# Patient Record
Sex: FEMALE | Race: OTHER | Employment: FULL TIME | URBAN - METROPOLITAN AREA
[De-identification: names, ages, dates, MRNs, and addresses within clinical notes are randomized per-mention and may not be internally consistent; named-entity substitution may affect disease eponyms.]

---
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
## 2020-07-09 ENCOUNTER — HOSPITAL ENCOUNTER (EMERGENCY)
Facility: HOSPITAL | Age: 20
Discharge: HOME | End: 2020-07-09
Attending: EMERGENCY MEDICINE
Payer: MEDICAID

## 2020-07-09 VITALS
HEART RATE: 83 BPM | WEIGHT: 175 LBS | OXYGEN SATURATION: 99 % | DIASTOLIC BLOOD PRESSURE: 83 MMHG | SYSTOLIC BLOOD PRESSURE: 133 MMHG | RESPIRATION RATE: 16 BRPM | BODY MASS INDEX: 31.01 KG/M2 | HEIGHT: 63 IN | TEMPERATURE: 98 F

## 2020-07-09 DIAGNOSIS — L02.91 ABSCESS: Primary | ICD-10-CM

## 2020-07-09 LAB — B-HCG UR QL: NEGATIVE

## 2020-07-09 PROCEDURE — 81025 URINE PREGNANCY TEST: CPT

## 2020-07-09 PROCEDURE — 87077 CULTURE AEROBIC IDENTIFY: CPT | Performed by: EMERGENCY MEDICINE

## 2020-07-09 PROCEDURE — 10061 I&D ABSCESS COMP/MULTIPLE: CPT

## 2020-07-09 PROCEDURE — 87205 SMEAR GRAM STAIN: CPT | Performed by: EMERGENCY MEDICINE

## 2020-07-09 PROCEDURE — 87070 CULTURE OTHR SPECIMN AEROBIC: CPT | Performed by: EMERGENCY MEDICINE

## 2020-07-09 PROCEDURE — 99283 EMERGENCY DEPT VISIT LOW MDM: CPT

## 2020-07-09 RX ORDER — CEPHALEXIN 500 MG/1
500 CAPSULE ORAL 3 TIMES DAILY
Qty: 30 CAPSULE | Refills: 0 | Status: SHIPPED | OUTPATIENT
Start: 2020-07-09 | End: 2020-07-19

## 2020-07-09 RX ORDER — LIDOCAINE HYDROCHLORIDE AND EPINEPHRINE 20; 5 MG/ML; UG/ML
INJECTION, SOLUTION EPIDURAL; INFILTRATION; INTRACAUDAL; PERINEURAL
Status: COMPLETED
Start: 2020-07-09 | End: 2020-07-09

## 2020-07-09 RX ORDER — CEPHALEXIN 500 MG/1
500 CAPSULE ORAL 3 TIMES DAILY
Qty: 21 CAPSULE | Refills: 0 | Status: SHIPPED | OUTPATIENT
Start: 2020-07-09 | End: 2020-07-09

## 2020-07-09 RX ORDER — SULFAMETHOXAZOLE AND TRIMETHOPRIM 800; 160 MG/1; MG/1
1 TABLET ORAL 2 TIMES DAILY
Qty: 20 TABLET | Refills: 0 | Status: SHIPPED | OUTPATIENT
Start: 2020-07-09 | End: 2020-07-19

## 2020-07-09 RX ORDER — LIDOCAINE HYDROCHLORIDE AND EPINEPHRINE 20; 5 MG/ML; UG/ML
10 INJECTION, SOLUTION EPIDURAL; INFILTRATION; INTRACAUDAL; PERINEURAL ONCE
Status: COMPLETED | OUTPATIENT
Start: 2020-07-09 | End: 2020-07-09

## 2020-07-09 NOTE — ED NOTES
PT safe to DC home per MD. Gael Arzola to dress self. DC teaching done, pt verbalizes understanding. Ambulatory with steady gait to exit.

## 2020-07-09 NOTE — ED INITIAL ASSESSMENT (HPI)
Pt came in for abscess to buttocks for a week. Afebrile. Redness noted and painful with movement. RR even and nonlabored, speaking in full sentences, ambulatory with steady gait.

## 2020-07-09 NOTE — ED PROVIDER NOTES
Patient Seen in: [facility] Emergency Department    History   Patient presents with:  Abscess    Stated Complaint:     HPI  Patient complains of skin infection for  7 days. Located R buttock. Describes as pressure and pain with sitting.   No fev erythematous, +ttp  PSYCH: calm, cooperative,          ED Course     Labs Reviewed   [facility] POCT PREGNANCY URINE - Normal   AEROBIC BACTERIAL CULTURE       MDM             Procedures:    Abscess drainage: The patient abscess was located R buttocks.    I obtai

## 2020-07-12 NOTE — PROGRESS NOTES
Patient treated appropriately, no further action needed. This likely represents skin dedrick. Thank you.

## 2021-12-23 ENCOUNTER — LAB ENCOUNTER (OUTPATIENT)
Dept: LAB | Age: 21
End: 2021-12-23
Attending: FAMILY MEDICINE
Payer: MEDICAID

## 2021-12-23 ENCOUNTER — TELEMEDICINE (OUTPATIENT)
Dept: FAMILY MEDICINE CLINIC | Facility: CLINIC | Age: 21
End: 2021-12-23

## 2021-12-23 DIAGNOSIS — N91.2 AMENORRHEA: ICD-10-CM

## 2021-12-23 DIAGNOSIS — N91.2 AMENORRHEA: Primary | ICD-10-CM

## 2021-12-23 PROCEDURE — 36415 COLL VENOUS BLD VENIPUNCTURE: CPT

## 2021-12-23 PROCEDURE — 99213 OFFICE O/P EST LOW 20 MIN: CPT | Performed by: FAMILY MEDICINE

## 2021-12-23 PROCEDURE — 84703 CHORIONIC GONADOTROPIN ASSAY: CPT

## 2021-12-23 NOTE — PROGRESS NOTES
VIDEO VISIT      Patient presents today complaining of 3 days of nasal congestion that is clear. Some nocturnal cough denies shortness of breath or tooth pain no bad breath. No headache no facial pain no fever. Took 3 Covid test all were negative.   Adri Lee

## 2021-12-24 ENCOUNTER — PATIENT MESSAGE (OUTPATIENT)
Dept: FAMILY MEDICINE CLINIC | Facility: CLINIC | Age: 21
End: 2021-12-24

## 2021-12-24 NOTE — TELEPHONE ENCOUNTER
From: Nia Lozoya  To: Quin Paredes DO  Sent: 12/24/2021 10:40 AM CST  Subject: Question regarding HCG, BETA SUBUNIT, QUAL    Where do I get the sudafed? You can prescribe me a medication? I went to the doctors for a reason.  Steve Bloom been taking [facility]

## 2022-01-06 RX ORDER — IBUPROFEN 800 MG/1
800 TABLET ORAL 3 TIMES DAILY
Qty: 90 TABLET | Refills: 0 | Status: SHIPPED | OUTPATIENT
Start: 2022-01-06

## 2022-04-06 ENCOUNTER — OFFICE VISIT (OUTPATIENT)
Dept: FAMILY MEDICINE CLINIC | Facility: CLINIC | Age: 22
End: 2022-04-06
Payer: MEDICAID

## 2022-04-06 VITALS
WEIGHT: 209 LBS | HEIGHT: 63 IN | BODY MASS INDEX: 37.03 KG/M2 | HEART RATE: 67 BPM | DIASTOLIC BLOOD PRESSURE: 69 MMHG | SYSTOLIC BLOOD PRESSURE: 107 MMHG

## 2022-04-06 DIAGNOSIS — G89.29 CHRONIC BILATERAL THORACIC BACK PAIN: ICD-10-CM

## 2022-04-06 DIAGNOSIS — N62 LARGE BREASTS: ICD-10-CM

## 2022-04-06 DIAGNOSIS — M54.6 CHRONIC BILATERAL THORACIC BACK PAIN: ICD-10-CM

## 2022-04-06 DIAGNOSIS — Z00.00 WELL ADULT EXAM: Primary | ICD-10-CM

## 2022-04-06 PROCEDURE — 3078F DIAST BP <80 MM HG: CPT | Performed by: NURSE PRACTITIONER

## 2022-04-06 PROCEDURE — 99395 PREV VISIT EST AGE 18-39: CPT | Performed by: NURSE PRACTITIONER

## 2022-04-06 PROCEDURE — 3008F BODY MASS INDEX DOCD: CPT | Performed by: NURSE PRACTITIONER

## 2022-04-06 PROCEDURE — 3074F SYST BP LT 130 MM HG: CPT | Performed by: NURSE PRACTITIONER

## 2022-04-27 ENCOUNTER — OFFICE VISIT (OUTPATIENT)
Dept: FAMILY MEDICINE CLINIC | Facility: CLINIC | Age: 22
End: 2022-04-27
Payer: MEDICAID

## 2022-04-27 VITALS
HEART RATE: 83 BPM | WEIGHT: 203 LBS | BODY MASS INDEX: 35.97 KG/M2 | TEMPERATURE: 98 F | HEIGHT: 63 IN | DIASTOLIC BLOOD PRESSURE: 69 MMHG | SYSTOLIC BLOOD PRESSURE: 107 MMHG

## 2022-04-27 DIAGNOSIS — M54.6 CHRONIC BILATERAL THORACIC BACK PAIN: ICD-10-CM

## 2022-04-27 DIAGNOSIS — N62 LARGE BREASTS: ICD-10-CM

## 2022-04-27 DIAGNOSIS — J02.9 SORE THROAT: ICD-10-CM

## 2022-04-27 DIAGNOSIS — G89.29 CHRONIC BILATERAL THORACIC BACK PAIN: ICD-10-CM

## 2022-04-27 DIAGNOSIS — H66.91 RIGHT ACUTE OTITIS MEDIA: Primary | ICD-10-CM

## 2022-04-27 LAB
CONTROL LINE PRESENT WITH A CLEAR BACKGROUND (YES/NO): YES YES/NO
KIT LOT #: NORMAL NUMERIC
SARS-COV-2 RNA RESP QL NAA+PROBE: NOT DETECTED
STREP GRP A CUL-SCR: NEGATIVE

## 2022-04-27 PROCEDURE — 3008F BODY MASS INDEX DOCD: CPT | Performed by: NURSE PRACTITIONER

## 2022-04-27 PROCEDURE — 3074F SYST BP LT 130 MM HG: CPT | Performed by: NURSE PRACTITIONER

## 2022-04-27 PROCEDURE — 99214 OFFICE O/P EST MOD 30 MIN: CPT | Performed by: NURSE PRACTITIONER

## 2022-04-27 PROCEDURE — 87880 STREP A ASSAY W/OPTIC: CPT | Performed by: NURSE PRACTITIONER

## 2022-04-27 PROCEDURE — 3078F DIAST BP <80 MM HG: CPT | Performed by: NURSE PRACTITIONER

## 2022-04-27 RX ORDER — AMOXICILLIN AND CLAVULANATE POTASSIUM 875; 125 MG/1; MG/1
1 TABLET, FILM COATED ORAL 2 TIMES DAILY
Qty: 14 TABLET | Refills: 0 | Status: SHIPPED | OUTPATIENT
Start: 2022-04-27 | End: 2022-05-04

## 2022-06-01 ENCOUNTER — APPOINTMENT (OUTPATIENT)
Dept: PHYSICAL THERAPY | Age: 22
End: 2022-06-01
Attending: NURSE PRACTITIONER
Payer: MEDICAID

## 2022-06-03 ENCOUNTER — APPOINTMENT (OUTPATIENT)
Dept: PHYSICAL THERAPY | Age: 22
End: 2022-06-03
Attending: NURSE PRACTITIONER
Payer: MEDICAID

## 2022-06-08 ENCOUNTER — TELEPHONE (OUTPATIENT)
Dept: PHYSICAL THERAPY | Facility: HOSPITAL | Age: 22
End: 2022-06-08

## 2022-06-08 ENCOUNTER — APPOINTMENT (OUTPATIENT)
Dept: PHYSICAL THERAPY | Age: 22
End: 2022-06-08
Attending: NURSE PRACTITIONER
Payer: MEDICAID

## 2022-06-10 ENCOUNTER — APPOINTMENT (OUTPATIENT)
Dept: PHYSICAL THERAPY | Age: 22
End: 2022-06-10
Attending: NURSE PRACTITIONER
Payer: MEDICAID

## 2022-06-15 ENCOUNTER — APPOINTMENT (OUTPATIENT)
Dept: PHYSICAL THERAPY | Age: 22
End: 2022-06-15
Attending: NURSE PRACTITIONER
Payer: MEDICAID

## 2022-06-17 ENCOUNTER — APPOINTMENT (OUTPATIENT)
Dept: PHYSICAL THERAPY | Age: 22
End: 2022-06-17
Attending: NURSE PRACTITIONER
Payer: MEDICAID

## 2022-06-22 ENCOUNTER — APPOINTMENT (OUTPATIENT)
Dept: PHYSICAL THERAPY | Age: 22
End: 2022-06-22
Attending: NURSE PRACTITIONER
Payer: MEDICAID

## 2022-06-24 ENCOUNTER — APPOINTMENT (OUTPATIENT)
Dept: PHYSICAL THERAPY | Age: 22
End: 2022-06-24
Attending: NURSE PRACTITIONER
Payer: MEDICAID

## 2022-07-07 ENCOUNTER — APPOINTMENT (OUTPATIENT)
Dept: PHYSICAL THERAPY | Age: 22
End: 2022-07-07
Attending: NURSE PRACTITIONER
Payer: MEDICAID

## 2022-07-12 ENCOUNTER — APPOINTMENT (OUTPATIENT)
Dept: PHYSICAL THERAPY | Age: 22
End: 2022-07-12
Attending: NURSE PRACTITIONER
Payer: MEDICAID

## 2022-07-14 ENCOUNTER — APPOINTMENT (OUTPATIENT)
Dept: PHYSICAL THERAPY | Age: 22
End: 2022-07-14
Attending: NURSE PRACTITIONER
Payer: MEDICAID

## 2022-07-29 ENCOUNTER — LAB ENCOUNTER (OUTPATIENT)
Dept: LAB | Age: 22
End: 2022-07-29
Attending: NURSE PRACTITIONER
Payer: MEDICAID

## 2022-07-29 ENCOUNTER — TELEPHONE (OUTPATIENT)
Dept: FAMILY MEDICINE CLINIC | Facility: CLINIC | Age: 22
End: 2022-07-29

## 2022-07-29 ENCOUNTER — PATIENT MESSAGE (OUTPATIENT)
Dept: FAMILY MEDICINE CLINIC | Facility: CLINIC | Age: 22
End: 2022-07-29

## 2022-07-29 DIAGNOSIS — Z00.00 WELL ADULT EXAM: ICD-10-CM

## 2022-07-29 LAB
ALBUMIN SERPL-MCNC: 3.7 G/DL (ref 3.4–5)
ALBUMIN/GLOB SERPL: 1.1 {RATIO} (ref 1–2)
ALP LIVER SERPL-CCNC: 96 U/L
ALT SERPL-CCNC: 33 U/L
ANION GAP SERPL CALC-SCNC: 5 MMOL/L (ref 0–18)
AST SERPL-CCNC: 21 U/L (ref 15–37)
BASOPHILS # BLD AUTO: 0.06 X10(3) UL (ref 0–0.2)
BASOPHILS NFR BLD AUTO: 0.8 %
BILIRUB SERPL-MCNC: 0.4 MG/DL (ref 0.1–2)
BUN BLD-MCNC: 9 MG/DL (ref 7–18)
BUN/CREAT SERPL: 11.3 (ref 10–20)
CALCIUM BLD-MCNC: 8.9 MG/DL (ref 8.5–10.1)
CHLORIDE SERPL-SCNC: 108 MMOL/L (ref 98–112)
CHOLEST SERPL-MCNC: 142 MG/DL (ref ?–200)
CO2 SERPL-SCNC: 27 MMOL/L (ref 21–32)
CREAT BLD-MCNC: 0.8 MG/DL
DEPRECATED RDW RBC AUTO: 44.4 FL (ref 35.1–46.3)
EOSINOPHIL # BLD AUTO: 0.11 X10(3) UL (ref 0–0.7)
EOSINOPHIL NFR BLD AUTO: 1.4 %
ERYTHROCYTE [DISTWIDTH] IN BLOOD BY AUTOMATED COUNT: 12.8 % (ref 11–15)
EST. AVERAGE GLUCOSE BLD GHB EST-MCNC: 105 MG/DL (ref 68–126)
FASTING PATIENT LIPID ANSWER: YES
FASTING STATUS PATIENT QL REPORTED: YES
GLOBULIN PLAS-MCNC: 3.5 G/DL (ref 2.8–4.4)
GLUCOSE BLD-MCNC: 84 MG/DL (ref 70–99)
HBA1C MFR BLD: 5.3 % (ref ?–5.7)
HCT VFR BLD AUTO: 42.6 %
HDLC SERPL-MCNC: 47 MG/DL (ref 40–59)
HGB BLD-MCNC: 14 G/DL
IMM GRANULOCYTES # BLD AUTO: 0.03 X10(3) UL (ref 0–1)
IMM GRANULOCYTES NFR BLD: 0.4 %
LDLC SERPL CALC-MCNC: 80 MG/DL (ref ?–100)
LYMPHOCYTES # BLD AUTO: 2.11 X10(3) UL (ref 1–4)
LYMPHOCYTES NFR BLD AUTO: 26.6 %
MCH RBC QN AUTO: 31.2 PG (ref 26–34)
MCHC RBC AUTO-ENTMCNC: 32.9 G/DL (ref 31–37)
MCV RBC AUTO: 94.9 FL
MONOCYTES # BLD AUTO: 0.5 X10(3) UL (ref 0.1–1)
MONOCYTES NFR BLD AUTO: 6.3 %
NEUTROPHILS # BLD AUTO: 5.13 X10 (3) UL (ref 1.5–7.7)
NEUTROPHILS # BLD AUTO: 5.13 X10(3) UL (ref 1.5–7.7)
NEUTROPHILS NFR BLD AUTO: 64.5 %
NONHDLC SERPL-MCNC: 95 MG/DL (ref ?–130)
OSMOLALITY SERPL CALC.SUM OF ELEC: 288 MOSM/KG (ref 275–295)
PLATELET # BLD AUTO: 227 10(3)UL (ref 150–450)
POTASSIUM SERPL-SCNC: 3.9 MMOL/L (ref 3.5–5.1)
PROT SERPL-MCNC: 7.2 G/DL (ref 6.4–8.2)
RBC # BLD AUTO: 4.49 X10(6)UL
SODIUM SERPL-SCNC: 140 MMOL/L (ref 136–145)
TRIGL SERPL-MCNC: 77 MG/DL (ref 30–149)
TSI SER-ACNC: 1.46 MIU/ML (ref 0.36–3.74)
VLDLC SERPL CALC-MCNC: 12 MG/DL (ref 0–30)
WBC # BLD AUTO: 7.9 X10(3) UL (ref 4–11)

## 2022-07-29 PROCEDURE — 80053 COMPREHEN METABOLIC PANEL: CPT

## 2022-07-29 PROCEDURE — 83036 HEMOGLOBIN GLYCOSYLATED A1C: CPT

## 2022-07-29 PROCEDURE — 84443 ASSAY THYROID STIM HORMONE: CPT

## 2022-07-29 PROCEDURE — 36415 COLL VENOUS BLD VENIPUNCTURE: CPT

## 2022-07-29 PROCEDURE — 85025 COMPLETE CBC W/AUTO DIFF WBC: CPT

## 2022-07-29 PROCEDURE — 80061 LIPID PANEL: CPT

## 2022-07-29 NOTE — TELEPHONE ENCOUNTER
Liz Bragg, states that the patient is there now to do blood work. The patient is requesting a note to excuse her from work today because she had to do her blood. Please, call pt at [phone] with any questions. Pt is requesting the note today.

## 2022-09-12 ENCOUNTER — TELEPHONE (OUTPATIENT)
Dept: INTERNAL MEDICINE CLINIC | Facility: CLINIC | Age: 22
End: 2022-09-12

## 2022-10-24 ENCOUNTER — TELEMEDICINE (OUTPATIENT)
Dept: FAMILY MEDICINE CLINIC | Facility: CLINIC | Age: 22
End: 2022-10-24

## 2022-10-24 ENCOUNTER — TELEPHONE (OUTPATIENT)
Dept: FAMILY MEDICINE CLINIC | Facility: CLINIC | Age: 22
End: 2022-10-24

## 2022-10-24 DIAGNOSIS — G43.009 NONINTRACTABLE MIGRAINE, UNSPECIFIED MIGRAINE TYPE: Primary | ICD-10-CM

## 2022-10-24 PROCEDURE — 99213 OFFICE O/P EST LOW 20 MIN: CPT | Performed by: FAMILY MEDICINE

## 2022-10-24 NOTE — PROGRESS NOTES
Last menstrual period 04/12/2022, not currently breastfeeding. Video visit    Presents today following up for migraine headache she had yesterday. Pain was relieved by lying down with eyes closed and Excedrin. Uses condoms for contraception. No pain today. Did have a numb sensation prior to the episode. Denies pregnancy. Denies medical problems. No surgical history.   Objective comfortable no apparent distress    Assessment migraine headache    Plan Excedrin as needed patient to check if she is pregnant she also plans to schedule an in person appointment to see me tomorrow

## 2022-10-24 NOTE — TELEPHONE ENCOUNTER
Patient called (identified name and ),   Just finished televisit with Dr Aminata Bobby to evaluate migraine. States he mentioned doing some tests but she is not sure what to do. No labs or imaging orders noted at the time of this call. Dr Aminata Bboby, please advise on what tests to be done?

## 2022-10-24 NOTE — TELEPHONE ENCOUNTER
Talked to patient she don't want to make an appointment with Dr Rosa Lanza but made an appointment with CECI.

## 2022-10-25 ENCOUNTER — OFFICE VISIT (OUTPATIENT)
Dept: FAMILY MEDICINE CLINIC | Facility: CLINIC | Age: 22
End: 2022-10-25
Payer: MEDICAID

## 2022-10-25 VITALS
WEIGHT: 201 LBS | HEIGHT: 63 IN | DIASTOLIC BLOOD PRESSURE: 75 MMHG | HEART RATE: 70 BPM | BODY MASS INDEX: 35.61 KG/M2 | SYSTOLIC BLOOD PRESSURE: 106 MMHG

## 2022-10-25 DIAGNOSIS — G43.109 MIGRAINE WITH AURA AND WITHOUT STATUS MIGRAINOSUS, NOT INTRACTABLE: ICD-10-CM

## 2022-10-25 DIAGNOSIS — N92.6 MISSED MENSES: Primary | ICD-10-CM

## 2022-10-25 PROBLEM — R51.9 NEW ONSET HEADACHE: Status: ACTIVE | Noted: 2022-10-25

## 2022-10-25 LAB
CONTROL LINE PRESENT WITH A CLEAR BACKGROUND (YES/NO): YES YES/NO
KIT LOT #: NORMAL NUMERIC
PREGNANCY TEST, URINE: NEGATIVE

## 2022-10-25 PROCEDURE — 3078F DIAST BP <80 MM HG: CPT | Performed by: NURSE PRACTITIONER

## 2022-10-25 PROCEDURE — 99214 OFFICE O/P EST MOD 30 MIN: CPT | Performed by: NURSE PRACTITIONER

## 2022-10-25 PROCEDURE — 3074F SYST BP LT 130 MM HG: CPT | Performed by: NURSE PRACTITIONER

## 2022-10-25 PROCEDURE — 81025 URINE PREGNANCY TEST: CPT | Performed by: NURSE PRACTITIONER

## 2022-10-25 PROCEDURE — 3008F BODY MASS INDEX DOCD: CPT | Performed by: NURSE PRACTITIONER

## 2022-10-25 RX ORDER — SUMATRIPTAN 25 MG/1
25 TABLET, FILM COATED ORAL EVERY 2 HOUR PRN
Qty: 9 TABLET | Refills: 1 | Status: SHIPPED | OUTPATIENT
Start: 2022-10-25

## 2022-10-26 NOTE — ASSESSMENT & PLAN NOTE
Urine pregnancy test is negative  Patient advised to call office if periods is not appear within the next 2 weeks.

## 2022-10-26 NOTE — ASSESSMENT & PLAN NOTE
New onset headache consistent with migraine with aura  Discussed self-care when migraine headaches occur  Will trial Imitrex 25 mg 1 p.o. at onset of headache may repeat x1 tablet in 2 hours if headache not resolved  Do not exceed 2 Imitrex tablets within 24 hours. Please call office if headache does not improve with Imitrex, symptoms worsen, or symptoms not resolving.

## 2023-03-22 ENCOUNTER — TELEMEDICINE (OUTPATIENT)
Dept: FAMILY MEDICINE CLINIC | Facility: CLINIC | Age: 23
End: 2023-03-22

## 2023-03-22 DIAGNOSIS — J30.1 SEASONAL ALLERGIC RHINITIS DUE TO POLLEN: Primary | ICD-10-CM

## 2023-03-22 PROCEDURE — 99213 OFFICE O/P EST LOW 20 MIN: CPT | Performed by: NURSE PRACTITIONER

## 2023-03-22 RX ORDER — FLUTICASONE PROPIONATE 50 MCG
2 SPRAY, SUSPENSION (ML) NASAL DAILY
Qty: 1 EACH | Refills: 2 | Status: SHIPPED | OUTPATIENT
Start: 2023-03-22 | End: 2024-03-16

## 2023-04-10 ENCOUNTER — TELEPHONE (OUTPATIENT)
Dept: INTERNAL MEDICINE CLINIC | Facility: CLINIC | Age: 23
End: 2023-04-10

## 2023-05-03 ENCOUNTER — PATIENT MESSAGE (OUTPATIENT)
Dept: FAMILY MEDICINE CLINIC | Facility: CLINIC | Age: 23
End: 2023-05-03

## 2023-05-04 NOTE — TELEPHONE ENCOUNTER
From: Marleen Back  To: CECI Fernandez  Sent: 5/3/2023 5:55 PM CDT  Subject: Birth control     Good evening,   I want to get on birth control but what different pill options do you have?

## 2023-05-10 ENCOUNTER — LAB ENCOUNTER (OUTPATIENT)
Dept: LAB | Age: 23
End: 2023-05-10
Attending: NURSE PRACTITIONER
Payer: MEDICAID

## 2023-05-10 ENCOUNTER — OFFICE VISIT (OUTPATIENT)
Dept: FAMILY MEDICINE CLINIC | Facility: CLINIC | Age: 23
End: 2023-05-10

## 2023-05-10 VITALS
WEIGHT: 191 LBS | DIASTOLIC BLOOD PRESSURE: 82 MMHG | BODY MASS INDEX: 33.84 KG/M2 | HEART RATE: 81 BPM | HEIGHT: 63 IN | SYSTOLIC BLOOD PRESSURE: 128 MMHG

## 2023-05-10 DIAGNOSIS — N89.8 VAGINAL DISCHARGE: ICD-10-CM

## 2023-05-10 DIAGNOSIS — N92.6 IRREGULAR PERIODS: ICD-10-CM

## 2023-05-10 DIAGNOSIS — Z23 IMMUNIZATION DUE: ICD-10-CM

## 2023-05-10 DIAGNOSIS — Z01.419 WELL WOMAN EXAM WITH ROUTINE GYNECOLOGICAL EXAM: ICD-10-CM

## 2023-05-10 DIAGNOSIS — Z01.419 WELL WOMAN EXAM WITH ROUTINE GYNECOLOGICAL EXAM: Primary | ICD-10-CM

## 2023-05-10 DIAGNOSIS — Z30.011 ENCOUNTER FOR INITIAL PRESCRIPTION OF CONTRACEPTIVE PILLS: ICD-10-CM

## 2023-05-10 PROBLEM — Z00.00 WELL ADULT EXAM: Status: ACTIVE | Noted: 2023-05-10

## 2023-05-10 LAB
ALBUMIN SERPL-MCNC: 3.7 G/DL (ref 3.4–5)
ALBUMIN/GLOB SERPL: 1 {RATIO} (ref 1–2)
ALP LIVER SERPL-CCNC: 76 U/L
ALT SERPL-CCNC: 25 U/L
ANION GAP SERPL CALC-SCNC: 7 MMOL/L (ref 0–18)
AST SERPL-CCNC: 17 U/L (ref 15–37)
BILIRUB SERPL-MCNC: 0.5 MG/DL (ref 0.1–2)
BUN BLD-MCNC: 12 MG/DL (ref 7–18)
BUN/CREAT SERPL: 16.2 (ref 10–20)
CALCIUM BLD-MCNC: 8.9 MG/DL (ref 8.5–10.1)
CHLORIDE SERPL-SCNC: 109 MMOL/L (ref 98–112)
CHOLEST SERPL-MCNC: 121 MG/DL (ref ?–200)
CO2 SERPL-SCNC: 25 MMOL/L (ref 21–32)
CONTROL LINE PRESENT WITH A CLEAR BACKGROUND (YES/NO): YES YES/NO
CREAT BLD-MCNC: 0.74 MG/DL
DEPRECATED RDW RBC AUTO: 42.3 FL (ref 35.1–46.3)
DHEA-S SERPL-MCNC: 247.8 UG/DL
ERYTHROCYTE [DISTWIDTH] IN BLOOD BY AUTOMATED COUNT: 12.3 % (ref 11–15)
EST. AVERAGE GLUCOSE BLD GHB EST-MCNC: 103 MG/DL (ref 68–126)
FASTING PATIENT LIPID ANSWER: YES
FASTING STATUS PATIENT QL REPORTED: YES
FSH SERPL-ACNC: 9.2 MIU/ML
GFR SERPLBLD BASED ON 1.73 SQ M-ARVRAT: 117 ML/MIN/1.73M2 (ref 60–?)
GLOBULIN PLAS-MCNC: 3.6 G/DL (ref 2.8–4.4)
GLUCOSE BLD-MCNC: 84 MG/DL (ref 70–99)
HBA1C MFR BLD: 5.2 % (ref ?–5.7)
HCT VFR BLD AUTO: 42.2 %
HDLC SERPL-MCNC: 52 MG/DL (ref 40–59)
HGB BLD-MCNC: 13.9 G/DL
INSULIN SERPL-ACNC: 21.8 MU/L (ref 3–25)
LDLC SERPL CALC-MCNC: 54 MG/DL (ref ?–100)
LH SERPL-ACNC: 17.3 MIU/ML
MCH RBC QN AUTO: 30.7 PG (ref 26–34)
MCHC RBC AUTO-ENTMCNC: 32.9 G/DL (ref 31–37)
MCV RBC AUTO: 93.2 FL
NONHDLC SERPL-MCNC: 69 MG/DL (ref ?–130)
OSMOLALITY SERPL CALC.SUM OF ELEC: 291 MOSM/KG (ref 275–295)
PLATELET # BLD AUTO: 227 10(3)UL (ref 150–450)
POTASSIUM SERPL-SCNC: 3.9 MMOL/L (ref 3.5–5.1)
PREGNANCY TEST, URINE: NEGATIVE
PROGEST SERPL-MCNC: 1.15 NG/ML
PROT SERPL-MCNC: 7.3 G/DL (ref 6.4–8.2)
RBC # BLD AUTO: 4.53 X10(6)UL
SODIUM SERPL-SCNC: 141 MMOL/L (ref 136–145)
TRIGL SERPL-MCNC: 75 MG/DL (ref 30–149)
TSI SER-ACNC: 1.1 MIU/ML (ref 0.36–3.74)
VIT B12 SERPL-MCNC: 347 PG/ML (ref 193–986)
VIT D+METAB SERPL-MCNC: 11.2 NG/ML (ref 30–100)
VLDLC SERPL CALC-MCNC: 11 MG/DL (ref 0–30)
WBC # BLD AUTO: 8.5 X10(3) UL (ref 4–11)

## 2023-05-10 PROCEDURE — 81025 URINE PREGNANCY TEST: CPT | Performed by: NURSE PRACTITIONER

## 2023-05-10 PROCEDURE — 36415 COLL VENOUS BLD VENIPUNCTURE: CPT

## 2023-05-10 PROCEDURE — 3079F DIAST BP 80-89 MM HG: CPT | Performed by: NURSE PRACTITIONER

## 2023-05-10 PROCEDURE — 82627 DEHYDROEPIANDROSTERONE: CPT

## 2023-05-10 PROCEDURE — 82306 VITAMIN D 25 HYDROXY: CPT | Performed by: NURSE PRACTITIONER

## 2023-05-10 PROCEDURE — 3074F SYST BP LT 130 MM HG: CPT | Performed by: NURSE PRACTITIONER

## 2023-05-10 PROCEDURE — 83001 ASSAY OF GONADOTROPIN (FSH): CPT

## 2023-05-10 PROCEDURE — 83036 HEMOGLOBIN GLYCOSYLATED A1C: CPT

## 2023-05-10 PROCEDURE — 82607 VITAMIN B-12: CPT

## 2023-05-10 PROCEDURE — 90471 IMMUNIZATION ADMIN: CPT | Performed by: NURSE PRACTITIONER

## 2023-05-10 PROCEDURE — 80053 COMPREHEN METABOLIC PANEL: CPT

## 2023-05-10 PROCEDURE — 90651 9VHPV VACCINE 2/3 DOSE IM: CPT | Performed by: NURSE PRACTITIONER

## 2023-05-10 PROCEDURE — 99395 PREV VISIT EST AGE 18-39: CPT | Performed by: NURSE PRACTITIONER

## 2023-05-10 PROCEDURE — 83002 ASSAY OF GONADOTROPIN (LH): CPT

## 2023-05-10 PROCEDURE — 84144 ASSAY OF PROGESTERONE: CPT

## 2023-05-10 PROCEDURE — 85027 COMPLETE CBC AUTOMATED: CPT

## 2023-05-10 PROCEDURE — 3008F BODY MASS INDEX DOCD: CPT | Performed by: NURSE PRACTITIONER

## 2023-05-10 PROCEDURE — 84410 TESTOSTERONE BIOAVAILABLE: CPT

## 2023-05-10 PROCEDURE — 80061 LIPID PANEL: CPT

## 2023-05-10 PROCEDURE — 84443 ASSAY THYROID STIM HORMONE: CPT

## 2023-05-10 PROCEDURE — 83525 ASSAY OF INSULIN: CPT

## 2023-05-10 NOTE — PATIENT INSTRUCTIONS
ORAL CONTRACEPTIVES    -start first pack within first 5 days of period  -take pill at same time everyday  -does not protect against STDs  -If one active pill is missed:  the woman should take an active pill as soon as possible and continue taking pills daily, one each day; additional contraception is not needed. If two or more active pills are missed: the woman should take an active pill as soon as possible and continue taking pills daily, one each day; the woman should use additional contraception until she has taken active pills for 7 days in a row; if the missed pills are in the third week of the pack, she should finish the current pack without taking the inactive pills and start a new pack the next day.   If inactive pills are missed: the woman should discard the missed pills and continue taking pills daily, one each day.-use back up birth control for 2 weeks whenever you take antibiotics as some antibiotics may interfere with effectiveness of birth control pills  -use back up birth control for first pack of birth control pills  -pt denies personal or family history of blood disorders or breast cancer  -pt is a non-smoker and advised not to start smoking or use nicotine products  -seek immediate medical attention if chest pain, shortness of breath or lower, posterior leg pain with or without swelling occurs
no

## 2023-05-11 ENCOUNTER — PATIENT MESSAGE (OUTPATIENT)
Dept: FAMILY MEDICINE CLINIC | Facility: CLINIC | Age: 23
End: 2023-05-11

## 2023-05-11 LAB
C TRACH DNA SPEC QL NAA+PROBE: NEGATIVE
N GONORRHOEA DNA SPEC QL NAA+PROBE: NEGATIVE

## 2023-05-11 NOTE — ASSESSMENT & PLAN NOTE
Please aim to eat a diet high in fresh fruits and vegetables, lean protein sources, complex carbohydrates and limited processed and fast foods. Try to get at least 150 minutes of exercise per week-a combination of weight resistance and cardio is preferred.     Screening labs  Pap, g/c and vag culture completed

## 2023-05-11 NOTE — ASSESSMENT & PLAN NOTE
Discussed contraception options, pros and cons  Pt would like to start ocps  No family medical history breast or ovarian ca or bleeding disorders  Discussed how to take pills, missed pills   Needs back up for 1st cycle of pills, on antibiotics for 2 weeks.   Call when period starts  Follow up if no menses within 2 weeks    Encourage to use condoms until she has been on ocps for one month

## 2023-05-12 NOTE — TELEPHONE ENCOUNTER
From: Meli Funez  To: CECI Perales  Sent: 5/11/2023 7:16 PM CDT  Subject: Test results     Is there any concerns with my blood work ? And what can I use for the yeast infection ?

## 2023-05-13 LAB
GENITAL VAGINOSIS SCREEN: NEGATIVE
TRICHOMONAS SCREEN: NEGATIVE

## 2023-05-13 RX ORDER — FLUCONAZOLE 200 MG/1
TABLET ORAL
Qty: 2 TABLET | Refills: 0 | Status: SHIPPED | OUTPATIENT
Start: 2023-05-13

## 2023-05-14 ENCOUNTER — PATIENT MESSAGE (OUTPATIENT)
Dept: FAMILY MEDICINE CLINIC | Facility: CLINIC | Age: 23
End: 2023-05-14

## 2023-05-15 ENCOUNTER — PATIENT MESSAGE (OUTPATIENT)
Dept: FAMILY MEDICINE CLINIC | Facility: CLINIC | Age: 23
End: 2023-05-15

## 2023-05-15 ENCOUNTER — TELEPHONE (OUTPATIENT)
Dept: FAMILY MEDICINE CLINIC | Facility: CLINIC | Age: 23
End: 2023-05-15

## 2023-05-15 RX ORDER — NORETHINDRONE ACETATE AND ETHINYL ESTRADIOL 1; .02 MG/1; MG/1
1 TABLET ORAL DAILY
Qty: 3 EACH | Refills: 3 | Status: SHIPPED | OUTPATIENT
Start: 2023-05-15

## 2023-05-15 NOTE — TELEPHONE ENCOUNTER
Patient was instructed to call after her menstrual cycle began to request a prescription for birth control. Patient's cycle began on 5/14.

## 2023-05-16 ENCOUNTER — PATIENT MESSAGE (OUTPATIENT)
Dept: FAMILY MEDICINE CLINIC | Facility: CLINIC | Age: 23
End: 2023-05-16

## 2023-05-16 DIAGNOSIS — E55.9 VITAMIN D DEFICIENCY: Primary | ICD-10-CM

## 2023-05-16 NOTE — TELEPHONE ENCOUNTER
From: CECI Gallardo  To: Raeannleela elisabet  Sent: 5/15/2023 3:38 PM CDT  Subject: birth control pills    New prescription/s placed for birth control pills. ORAL CONTRACEPTIVES  -start first pack within first 5 days of period  -take pill at same time everyday  -does not protect against STDs  -If one active pill is missed: the woman should take an active pill as soon as possible and continue taking pills daily, one each day; additional contraception is not needed. If two or more active pills are missed: the woman should take an active pill as soon as possible and continue taking pills daily, one each day; the woman should use additional contraception until she has taken active pills for 7 days in a row; if the missed pills are in the third week of the pack, she should finish the current pack without taking the inactive pills and start a new pack the next day.   If inactive pills are missed: the woman should discard the missed pills and continue taking pills daily, one each day.-use back up birth control for 2 weeks whenever you take antibiotics as some antibiotics may interfere with effectiveness of birth control pills  -use back up birth control for first pack of birth control pills  -pt denies personal or family history of blood disorders or breast cancer  -pt is a non-smoker and advised not to start smoking or use nicotine products  -seek immediate medical attention if chest pain, shortness of breath or lower, posterior leg pain with or without swelling occurs

## 2023-05-17 LAB
SEX HORM BIND GLOB: 20.3 NMOL/L
TESTOST % FREE+WEAK BND: 31.4 %
TESTOST FREE+WEAK BND: 12.1 NG/DL
TESTOSTERONE TOT /MS: 38.4 NG/DL

## 2023-05-17 RX ORDER — CHOLECALCIFEROL (VITAMIN D3) 1250 MCG
50000 CAPSULE ORAL WEEKLY
Qty: 12 CAPSULE | Refills: 3 | Status: SHIPPED | OUTPATIENT
Start: 2023-05-17 | End: 2023-06-16

## 2023-05-17 NOTE — TELEPHONE ENCOUNTER
From: CECI Fernandez  To: Marleen Back  Sent: 5/16/2023 4:45 PM CDT  Subject: birth control    You can start today-back when they used to say to start the pill on a Sunday (why?-no good reason). Just start today and keep taking.  ERMIAS Fernandez, FNP-C

## 2023-06-23 ENCOUNTER — TELEPHONE (OUTPATIENT)
Dept: FAMILY MEDICINE CLINIC | Facility: CLINIC | Age: 23
End: 2023-06-23

## 2023-06-23 NOTE — TELEPHONE ENCOUNTER
Patient contacted and made aware of CECI Richardson's interpretation and recommendation. Patient verbalized understanding. No further questions or concerns at this time.

## 2023-06-23 NOTE — TELEPHONE ENCOUNTER
It sometimes takes a couple of cycles for bleeding to regulate. Symptoms should improve over time. Please let me know if you have any questions or concerns.

## 2023-09-15 ENCOUNTER — PATIENT MESSAGE (OUTPATIENT)
Dept: FAMILY MEDICINE CLINIC | Facility: CLINIC | Age: 23
End: 2023-09-15

## 2023-09-18 ENCOUNTER — NURSE TRIAGE (OUTPATIENT)
Dept: FAMILY MEDICINE CLINIC | Facility: CLINIC | Age: 23
End: 2023-09-18

## 2023-09-19 ENCOUNTER — LAB ENCOUNTER (OUTPATIENT)
Dept: LAB | Age: 23
End: 2023-09-19
Attending: NURSE PRACTITIONER
Payer: MEDICAID

## 2023-09-19 ENCOUNTER — OFFICE VISIT (OUTPATIENT)
Dept: FAMILY MEDICINE CLINIC | Facility: CLINIC | Age: 23
End: 2023-09-19

## 2023-09-19 ENCOUNTER — TELEPHONE (OUTPATIENT)
Dept: FAMILY MEDICINE CLINIC | Facility: CLINIC | Age: 23
End: 2023-09-19

## 2023-09-19 VITALS
DIASTOLIC BLOOD PRESSURE: 81 MMHG | SYSTOLIC BLOOD PRESSURE: 118 MMHG | HEIGHT: 63 IN | HEART RATE: 69 BPM | TEMPERATURE: 98 F | WEIGHT: 190 LBS | BODY MASS INDEX: 33.66 KG/M2

## 2023-09-19 DIAGNOSIS — J30.1 SEASONAL ALLERGIC RHINITIS DUE TO POLLEN: ICD-10-CM

## 2023-09-19 DIAGNOSIS — J30.1 SEASONAL ALLERGIC RHINITIS DUE TO POLLEN: Primary | ICD-10-CM

## 2023-09-19 DIAGNOSIS — H61.23 BILATERAL IMPACTED CERUMEN: ICD-10-CM

## 2023-09-19 PROCEDURE — 3008F BODY MASS INDEX DOCD: CPT | Performed by: NURSE PRACTITIONER

## 2023-09-19 PROCEDURE — 86003 ALLG SPEC IGE CRUDE XTRC EA: CPT

## 2023-09-19 PROCEDURE — 3079F DIAST BP 80-89 MM HG: CPT | Performed by: NURSE PRACTITIONER

## 2023-09-19 PROCEDURE — 99214 OFFICE O/P EST MOD 30 MIN: CPT | Performed by: NURSE PRACTITIONER

## 2023-09-19 PROCEDURE — 3074F SYST BP LT 130 MM HG: CPT | Performed by: NURSE PRACTITIONER

## 2023-09-19 PROCEDURE — 36415 COLL VENOUS BLD VENIPUNCTURE: CPT

## 2023-09-19 PROCEDURE — 82785 ASSAY OF IGE: CPT

## 2023-09-19 RX ORDER — PREDNISONE 20 MG/1
40 TABLET ORAL DAILY
Qty: 6 TABLET | Refills: 0 | Status: SHIPPED | OUTPATIENT
Start: 2023-09-19 | End: 2023-09-19

## 2023-09-19 RX ORDER — PREDNISONE 20 MG/1
40 TABLET ORAL DAILY
Qty: 6 TABLET | Refills: 0 | Status: SHIPPED | OUTPATIENT
Start: 2023-09-19 | End: 2023-09-22

## 2023-09-19 RX ORDER — MONTELUKAST SODIUM 10 MG/1
10 TABLET ORAL DAILY
Qty: 90 TABLET | Refills: 3 | Status: SHIPPED | OUTPATIENT
Start: 2023-09-19 | End: 2024-09-13

## 2023-09-19 RX ORDER — CHOLECALCIFEROL (VITAMIN D3) 1250 MCG
1 CAPSULE ORAL WEEKLY
COMMUNITY
Start: 2023-07-12

## 2023-09-19 NOTE — TELEPHONE ENCOUNTER
Maria Ines Cobos - seeking clarification, directions: \"Take 2 tablets (40 mg total) by mouth daily for 3 days. Take 2 tablets once a day for 5 days \"    Please advise which set of directions is correct. Thanks!

## 2023-09-19 NOTE — ASSESSMENT & PLAN NOTE
Serum allergy testing  -otc non-drowsy antihistamine (generic claritin, zyrtec or allegra)  -add steroidal nasal spray (flonase, rhinocort -generic works well)  -add singulair 10 mg nightly  Prednisone 40 mg daily x 3 days    -supportive care discussed  -Please call if symptoms worsen or are not resolving.

## 2023-09-19 NOTE — TELEPHONE ENCOUNTER
Pharmacist is calling and states that there are two different directions on the following medication and he would like to verify which one is correct.     predniSONE 20 MG Oral Tab

## 2023-09-21 LAB
A ALTERNATA IGE QN: <0.1 KUA/L (ref ?–0.1)
C HERBARUM IGE QN: <0.1 KUA/L (ref ?–0.1)
CAT DANDER IGE QN: 62.2 KUA/L (ref ?–0.1)
COMMON RAGWEED IGE QN: 8.66 KUA/L (ref ?–0.1)
D FARINAE IGE QN: 3.14 KUA/L (ref ?–0.1)
DOG DANDER IGE QN: 12.1 KUA/L (ref ?–0.1)
GOOSEFOOT IGE QN: 0.32 KUA/L (ref ?–0.1)
HOUSE DUST HS IGE QN: 5.82 KUA/L (ref ?–0.1)
IGE SERPL-ACNC: 213 KU/L (ref 2–214)
KENT BLUE GRASS IGE QN: 2.89 KUA/L (ref ?–0.1)
PER RYE GRASS IGE QN: 2.3 KUA/L (ref ?–0.1)
WHITE ELM IGE QN: 0.51 KUA/L (ref ?–0.1)
WHITE OAK IGE QN: 0.57 KUA/L (ref ?–0.1)

## 2023-09-22 LAB
CLAM IGE QN: 0.17 KUA/L (ref ?–0.1)
CODFISH IGE QN: <0.1 KUA/L (ref ?–0.1)
CORN IGE QN: 1.39 KUA/L (ref ?–0.1)
COW MILK IGE QN: <0.1 KUA/L (ref ?–0.1)
EGG WHITE IGE QN: <0.1 KUA/L (ref ?–0.1)
IGE SERPL-ACNC: 207 KU/L (ref 2–214)
IGE SERPL-ACNC: 212 KU/L (ref 2–214)
PEANUT IGE QN: 0.55 KUA/L (ref ?–0.1)
SCALLOP IGE QN: 0.22 KUA/L (ref ?–0.1)
SESAME SEED IGE QN: 0.65 KUA/L (ref ?–0.1)
SHRIMP IGE QN: 0.18 KUA/L (ref ?–0.1)
SOYBEAN IGE QN: 0.33 KUA/L (ref ?–0.1)
WALNUT IGE QN: 0.38 KUA/L (ref ?–0.1)
WHEAT IGE QN: 0.41 KUA/L (ref ?–0.1)

## 2023-09-24 DIAGNOSIS — Z91.018 MULTIPLE FOOD ALLERGIES: ICD-10-CM

## 2023-09-24 DIAGNOSIS — J30.1 SEASONAL ALLERGIC RHINITIS DUE TO POLLEN: Primary | ICD-10-CM

## 2023-09-29 ENCOUNTER — PATIENT MESSAGE (OUTPATIENT)
Dept: ADMINISTRATIVE | Age: 23
End: 2023-09-29

## 2023-10-07 ENCOUNTER — TELEPHONE (OUTPATIENT)
Dept: FAMILY MEDICINE CLINIC | Facility: CLINIC | Age: 23
End: 2023-10-07

## 2023-10-07 NOTE — TELEPHONE ENCOUNTER
On call-pt calling with c/o of spotting since yesterday. Is not due to get period until 1 1/2 weeks from now. Takes pills daily at same time. Advised to monitor-if symptoms to continue, please call and we may consider changing ocps  Please let me know if you have any questions or concerns.

## 2023-10-10 NOTE — TELEPHONE ENCOUNTER
On call note-pt on ocps, start spotting on period for the last 4-5 days. Takes ocps daily at same time. Advised to monitor symptoms and if spotting continue to follow up in office to discuss changing ocp dose.

## 2023-10-10 NOTE — TELEPHONE ENCOUNTER
Message Delivered)   D E L I V E R I E S :  10/07/2023 02:56p           [facility]      Delivered  ======================================================================  Paging    Message # [phone]         10/07/2023 03:56p   [[email or web address]]  To:  From:  CONSOLE  Primary MD:  Phone#:  ----------------------------------------------------------------------  PT Randy JACOBSON [birth year]  RE PT STATRED PERIOD EARLY AND IS WONDERING WHY  [phone] Paged at number :  PAGE: [phone] at  :  OYD- 15:56

## 2024-01-19 ENCOUNTER — PATIENT MESSAGE (OUTPATIENT)
Dept: FAMILY MEDICINE CLINIC | Facility: CLINIC | Age: 24
End: 2024-01-19

## 2024-01-19 ENCOUNTER — TELEPHONE (OUTPATIENT)
Dept: FAMILY MEDICINE CLINIC | Facility: CLINIC | Age: 24
End: 2024-01-19

## 2024-01-19 ENCOUNTER — OFFICE VISIT (OUTPATIENT)
Dept: FAMILY MEDICINE CLINIC | Facility: CLINIC | Age: 24
End: 2024-01-19
Payer: MEDICAID

## 2024-01-19 VITALS
SYSTOLIC BLOOD PRESSURE: 115 MMHG | WEIGHT: 191 LBS | HEIGHT: 63 IN | HEART RATE: 79 BPM | DIASTOLIC BLOOD PRESSURE: 78 MMHG | BODY MASS INDEX: 33.84 KG/M2

## 2024-01-19 DIAGNOSIS — L91.8 SKIN TAGS, MULTIPLE ACQUIRED: ICD-10-CM

## 2024-01-19 DIAGNOSIS — Z30.011 ENCOUNTER FOR INITIAL PRESCRIPTION OF CONTRACEPTIVE PILLS: ICD-10-CM

## 2024-01-19 DIAGNOSIS — L70.0 ACNE VULGARIS: Primary | ICD-10-CM

## 2024-01-19 DIAGNOSIS — E34.9 FEMALE HYPERTESTOSTERONEMIA: ICD-10-CM

## 2024-01-19 PROBLEM — G43.109 MIGRAINE WITH AURA AND WITHOUT STATUS MIGRAINOSUS, NOT INTRACTABLE: Status: RESOLVED | Noted: 2022-10-25 | Resolved: 2024-01-19

## 2024-01-19 PROCEDURE — 99214 OFFICE O/P EST MOD 30 MIN: CPT | Performed by: NURSE PRACTITIONER

## 2024-01-19 PROCEDURE — 3008F BODY MASS INDEX DOCD: CPT | Performed by: NURSE PRACTITIONER

## 2024-01-19 PROCEDURE — 3078F DIAST BP <80 MM HG: CPT | Performed by: NURSE PRACTITIONER

## 2024-01-19 PROCEDURE — 3074F SYST BP LT 130 MM HG: CPT | Performed by: NURSE PRACTITIONER

## 2024-01-19 NOTE — ASSESSMENT & PLAN NOTE
Will start ocp to help control testosterone which should help w acne  Start ocps today-take one tablet daily  Discussed skin care  Please let me know if you have any questions or concerns.

## 2024-01-19 NOTE — ASSESSMENT & PLAN NOTE
Discussed ocp options-will try Natazia to help w elevated testosterone levesl  Take one tablet daily  Use back up birth control for one month  Use back up birth control when on antibiotics  Encourage safe sex practices  Please call if side effect/s occur

## 2024-01-19 NOTE — PROGRESS NOTES
Contraception      Pt here to discuss birth control options. Was having a lot of spotting after first month of pills.   Periods have been very heavy with clots.     Lmp-1/15/2024    Denies h/o bleeding disorders, breast or ovarian ca and migraines w aura    Has skin tags on neck-would like to have removed     Review of Systems   Constitutional:  Negative for activity change and appetite change.   Genitourinary:  Positive for menstrual problem.   Skin:         Skin tags on neck, increase in acne since being off ocps       Vitals:    01/19/24 0950   BP: 115/78   Pulse: 79   Weight: 191 lb (86.6 kg)   Height: 5' 3\" (1.6 m)     Body mass index is 33.83 kg/m².  Wt Readings from Last 6 Encounters:   01/19/24 191 lb (86.6 kg)   09/19/23 190 lb (86.2 kg)   05/10/23 191 lb (86.6 kg)   10/25/22 201 lb (91.2 kg)   04/27/22 203 lb (92.1 kg)   04/06/22 209 lb (94.8 kg)        Health Maintenance   Topic Date Due    COVID-19 Vaccine (1) Never done    DTaP,Tdap,and Td Vaccines (7 - Td or Tdap) 06/21/2023    HPV Vaccines (3 - 3-dose series) 08/02/2023    Influenza Vaccine (1) Never done    Annual Depression Screening  01/01/2024    Chlamydia Screening  05/10/2024    Annual Physical  05/10/2024    Pap Smear  05/10/2026    Pneumococcal Vaccine: Birth to 64yrs  Aged Out       Patient's last menstrual period was 01/15/2024 (exact date).    Past Medical History:   Diagnosis Date    Acne     Conjunctivitis of left eye 2011    per [employer]:  \"Conjunctivitis with plaque, OS\"       .No past surgical history on file.    Family History   Problem Relation Age of Onset    Diabetes Neg         No Diabetes    Glaucoma Neg         No Glaucoma       Social History     Socioeconomic History    Marital status: Single     Spouse name: Not on file    Number of children: Not on file    Years of education: Not on file    Highest education level: Not on file   Occupational History    Not on file   Tobacco Use    Smoking status: Never    Smokeless tobacco:  Never   Vaping Use    Vaping Use: Never used   Substance and Sexual Activity    Alcohol use: No    Drug use: No    Sexual activity: Not on file   Other Topics Concern     Service Not Asked    Blood Transfusions Not Asked    Caffeine Concern Yes     Comment: Soda, 2 a day    Occupational Exposure Not Asked    Hobby Hazards Not Asked    Sleep Concern Not Asked    Stress Concern Not Asked    Weight Concern Not Asked    Special Diet Not Asked    Back Care Not Asked    Exercise Not Asked    Bike Helmet Not Asked    Seat Belt Not Asked    Self-Exams Not Asked    Grew up on a farm Not Asked    History of tanning Not Asked    Outdoor occupation Not Asked    Pt has a pacemaker No    Pt has a defibrillator No    Breast feeding Not Asked    Reaction to local anesthetic No   Social History Narrative    Not on file     Social Determinants of Health     Financial Resource Strain: Not on file   Food Insecurity: Not on file   Transportation Needs: Not on file   Physical Activity: Not on file   Stress: Not on file   Social Connections: Not on file   Housing Stability: Not on file       Current Outpatient Medications   Medication Sig Dispense Refill    Estradiol Valerate-Dienogest 3/2-2/2-3/1 MG Oral Tab Take 1 tablet by mouth daily. 28 tablet 12    Cholecalciferol (VITAMIN D3) 1.25 MG (97420 UT) Oral Cap Take 1 capsule by mouth once a week.      SUMAtriptan (IMITREX) 25 MG Oral Tab Take 1 tablet (25 mg total) by mouth every 2 (two) hours as needed for Migraine. Use at onset; repeat once after 2 HRS-ONLY 2 IN 24 HR MAX 9 tablet 1    montelukast (SINGULAIR) 10 MG Oral Tab Take 1 tablet (10 mg total) by mouth daily. (Patient not taking: Reported on 1/19/2024) 90 tablet 3    fluticasone propionate 50 MCG/ACT Nasal Suspension 2 sprays by Each Nare route daily for 360 doses. (Patient not taking: Reported on 1/19/2024) 1 each 2       Allergies:  Allergies   Allergen Reactions    Cat Hair Extract HIVES, ITCHING, SHORTNESS OF BREATH,  SWELLING and WHEEZING    Horse Allergy HIVES, ITCHING, RASH, SWELLING and WHEEZING       Physical Exam  Vitals and nursing note reviewed.   Constitutional:       Appearance: Normal appearance. She is obese.   HENT:      Head: Normocephalic.   Cardiovascular:      Rate and Rhythm: Normal rate.   Pulmonary:      Effort: Pulmonary effort is normal. No respiratory distress.   Skin:     General: Skin is warm and dry.      Comments: Skin tags noted to neck area   Neurological:      Mental Status: She is alert and oriented to person, place, and time.         Assessment and Plan:  Problem List Items Addressed This Visit       Acne - Primary     Will start ocp to help control testosterone which should help w acne  Start ocps today-take one tablet daily  Discussed skin care  Please let me know if you have any questions or concerns.              Relevant Medications    Estradiol Valerate-Dienogest 3/2-2/2-3/1 MG Oral Tab    Encounter for initial prescription of contraceptive pills     Discussed ocp options-will try Natazia to help w elevated testosterone levesl  Take one tablet daily  Use back up birth control for one month  Use back up birth control when on antibiotics  Encourage safe sex practices  Please call if side effect/s occur           Relevant Medications    Estradiol Valerate-Dienogest 3/2-2/2-3/1 MG Oral Tab    Female hypertestosteronemia     Start ocps           Relevant Medications    Estradiol Valerate-Dienogest 3/2-2/2-3/1 MG Oral Tab    Skin tags, multiple acquired     Discussed that skin tags can be removed-call for appointment (40 min)                          Discussed plan of care with pt and pt is in agreement.All questions answered. Pt to call with questions or concerns.    Encouraged to sign up for My Chart if not already registered.

## 2024-01-19 NOTE — TELEPHONE ENCOUNTER
Pharmacy calling to request to get the brand name of the birth control. They are not sure of the name on the prescription.

## 2024-01-19 NOTE — TELEPHONE ENCOUNTER
Lynda, is it ok to authorize brand name?    Outpatient Medication Detail     Disp Refills Start End    Estradiol Valerate-Dienogest 3/2-2/2-3/1 MG Oral Tab 28 tablet 12 1/19/2024 1/18/2025    Sig - Route: Take 1 tablet by mouth daily. - Oral    Sent to pharmacy as: Estradiol Valerate-Dienogest 3/2-2/2-3/1 MG Oral Tablet    E-Prescribing Status: Receipt confirmed by pharmacy (1/19/2024 10:09 AM CST)

## 2024-01-19 NOTE — TELEPHONE ENCOUNTER
Called [facility], spoke with Ivette, pharmacist    The pharmacy needed the Brand name so they can order the medication for the patient. States that this medication was not linking up in the system. At this time, the medication shows no charge to the patient. Pharmacy will have to order as they do not have this in stock.    Will call back if there are any additional concerns.

## 2024-01-19 NOTE — TELEPHONE ENCOUNTER
Attempted to call the [facility] pharmacy,    pharmacy is currently closed and will reopen at 2pm  will call back

## 2024-01-19 NOTE — PATIENT INSTRUCTIONS
ORAL CONTRACEPTIVES    -start first pack within first 5 days of period  -take pill at same time everyday  -does not protect against STDs  -If one active pill is missed:  the woman should take an active pill as soon as possible and continue taking pills daily, one each day; additional contraception is not needed.  If two or more active pills are missed: the woman should take an active pill as soon as possible and continue taking pills daily, one each day; the woman should use additional contraception until she has taken active pills for 7 days in a row; if the missed pills are in the third week of the pack, she should finish the current pack without taking the inactive pills and start a new pack the next day.  If inactive pills are missed: the woman should discard the missed pills and continue taking pills daily, one each day.-use back up birth control for 2 weeks whenever you take antibiotics as some antibiotics may interfere with effectiveness of birth control pills  -use back up birth control for first pack of birth control pills  -pt denies personal or family history of blood disorders or breast cancer  -pt is a non-smoker and advised not to start smoking or use nicotine products  -seek immediate medical attention if chest pain, shortness of breath or lower, posterior leg pain with or without swelling occurs

## 2024-07-09 ENCOUNTER — OFFICE VISIT (OUTPATIENT)
Dept: FAMILY MEDICINE CLINIC | Facility: CLINIC | Age: 24
End: 2024-07-09
Payer: MEDICAID

## 2024-07-09 VITALS
BODY MASS INDEX: 35.26 KG/M2 | HEIGHT: 63 IN | WEIGHT: 199 LBS | SYSTOLIC BLOOD PRESSURE: 109 MMHG | DIASTOLIC BLOOD PRESSURE: 69 MMHG | HEART RATE: 67 BPM

## 2024-07-09 DIAGNOSIS — L91.8 SKIN TAGS, MULTIPLE ACQUIRED: Primary | ICD-10-CM

## 2024-07-09 PROCEDURE — 99213 OFFICE O/P EST LOW 20 MIN: CPT | Performed by: NURSE PRACTITIONER

## 2024-07-09 NOTE — PROGRESS NOTES
HPI  Pt presents for skin tag removal-she has numerous skin tags on back of neck, front of neck, chest, bilat axilla and sides of bilat breasts.    Discussed procedure for skin tag removal, use of local lidocaine to numb area and use of electrocautery for removal.   All benefits and risks discussed w pt including but not limited to infection, scarring and re-occurrence of skin tags.     Consent signed and witnessed.     Review of Systems    Vitals:    07/09/24 1614   BP: 109/69   Pulse: 67   Weight: 199 lb (90.3 kg)   Height: 5' 3\" (1.6 m)     Body mass index is 35.25 kg/m².  Wt Readings from Last 6 Encounters:   07/09/24 199 lb (90.3 kg)   01/19/24 191 lb (86.6 kg)   09/19/23 190 lb (86.2 kg)   05/10/23 191 lb (86.6 kg)   10/25/22 201 lb (91.2 kg)   04/27/22 203 lb (92.1 kg)        Health Maintenance   Topic Date Due    DTaP,Tdap,and Td Vaccines (7 - Td or Tdap) 06/21/2023    HPV Vaccines (3 - 3-dose series) 08/02/2023    COVID-19 Vaccine (1 - 2023-24 season) Never done    Annual Depression Screening  01/01/2024    Chlamydia Screening  05/10/2024    Annual Physical  05/10/2024    Influenza Vaccine (1) 10/01/2024    Pap Smear  05/10/2026    Pneumococcal Vaccine: Birth to 63yrs  Aged Out       Patient's last menstrual period was 07/01/2024 (exact date).    Past Medical History:    Acne    Conjunctivitis of left eye    per [employer]:  \"Conjunctivitis with plaque, OS\"       .History reviewed. No pertinent surgical history.    Family History   Problem Relation Age of Onset    Diabetes Neg         No Diabetes    Glaucoma Neg         No Glaucoma       Social History     Socioeconomic History    Marital status: Single     Spouse name: Not on file    Number of children: Not on file    Years of education: Not on file    Highest education level: Not on file   Occupational History    Not on file   Tobacco Use    Smoking status: Never    Smokeless tobacco: Never   Vaping Use    Vaping status: Never Used   Substance and Sexual  Activity    Alcohol use: No    Drug use: No    Sexual activity: Not on file   Other Topics Concern     Service Not Asked    Blood Transfusions Not Asked    Caffeine Concern Yes     Comment: Soda, 2 a day    Occupational Exposure Not Asked    Hobby Hazards Not Asked    Sleep Concern Not Asked    Stress Concern Not Asked    Weight Concern Not Asked    Special Diet Not Asked    Back Care Not Asked    Exercise Not Asked    Bike Helmet Not Asked    Seat Belt Not Asked    Self-Exams Not Asked    Grew up on a farm Not Asked    History of tanning Not Asked    Outdoor occupation Not Asked    Pt has a pacemaker No    Pt has a defibrillator No    Breast feeding Not Asked    Reaction to local anesthetic No   Social History Narrative    Not on file     Social Determinants of Health     Financial Resource Strain: Not on file   Food Insecurity: Not on file   Transportation Needs: Not on file   Physical Activity: Not on file   Stress: Not on file   Social Connections: Not on file   Housing Stability: Not on file       Current Outpatient Medications   Medication Sig Dispense Refill    Cholecalciferol (VITAMIN D3) 1.25 MG (19825 UT) Oral Cap Take 1 capsule by mouth once a week.      Estradiol Valerate-Dienogest 3/2-2/2-3/1 MG Oral Tab Take 1 tablet by mouth daily. (Patient not taking: Reported on 7/9/2024) 28 tablet 12    montelukast (SINGULAIR) 10 MG Oral Tab Take 1 tablet (10 mg total) by mouth daily. (Patient not taking: Reported on 1/19/2024) 90 tablet 3       Allergies:  Allergies   Allergen Reactions    Cat Hair Extract HIVES, ITCHING, SHORTNESS OF BREATH, SWELLING and WHEEZING    Horse Allergy HIVES, ITCHING, RASH, SWELLING and WHEEZING       Physical Exam  Vitals and nursing note reviewed.   Constitutional:       Appearance: Normal appearance.   Skin:     General: Skin is warm and dry.          Neurological:      Mental Status: She is alert.         Assessment and Plan:  Problem List Items Addressed This Visit        Skin tags, multiple acquired - Primary     Skin tags removed w electrocautery  Aftercare discussed w pt  Advised to use sunscreen to decrease scarring                          Discussed plan of care with pt and pt is in agreement.All questions answered. Pt to call with questions or concerns.    Encouraged to sign up for My Chart if not already registered.     Note to patient and family:  The 21st Century Cures Act makes medical notes available to patients in the interest of transparency.  However, please be advised that this is a medical document.  It is intended as a peer to peer communication.  It is written in medical language and may contain abbreviations or verbiage that are technical and unfamiliar.  It may appear blunt or direct.  Medical documents are intended to carry relevant information, facts as evident, and the clinical opinion of the practitioner.

## 2024-07-10 NOTE — ASSESSMENT & PLAN NOTE
Skin tags removed w electrocautery  Aftercare discussed w pt  Advised to use sunscreen to decrease scarring

## 2024-07-10 NOTE — PROCEDURES
Skin tag bases anesthetized with 1% plain lidocaine.  Skin tags removed with electrocautery.  Antibiotics ointment applied to cautery sites. Aftercare instructions given to pt.

## 2024-07-17 ENCOUNTER — PATIENT MESSAGE (OUTPATIENT)
Dept: FAMILY MEDICINE CLINIC | Facility: CLINIC | Age: 24
End: 2024-07-17

## 2024-07-17 NOTE — TELEPHONE ENCOUNTER
From: Alexander Smith  To: Lynda Pastrana  Sent: 7/17/2024 1:26 PM CDT  Subject: Skin tag removal     They aren’t healing well… I need an ointment or something to put on them

## 2024-07-23 ENCOUNTER — HOSPITAL ENCOUNTER (OUTPATIENT)
Age: 24
Discharge: HOME | End: 2024-07-23
Payer: MEDICAID

## 2024-07-23 VITALS
WEIGHT: 186 LBS | SYSTOLIC BLOOD PRESSURE: 123 MMHG | BODY MASS INDEX: 30.99 KG/M2 | DIASTOLIC BLOOD PRESSURE: 78 MMHG | RESPIRATION RATE: 16 BRPM | OXYGEN SATURATION: 98 % | HEIGHT: 65 IN | TEMPERATURE: 98 F | HEART RATE: 65 BPM

## 2024-07-23 DIAGNOSIS — B34.9 VIRAL ILLNESS: Primary | ICD-10-CM

## 2024-07-23 DIAGNOSIS — Z20.822 LAB TEST NEGATIVE FOR COVID-19 VIRUS: ICD-10-CM

## 2024-07-23 LAB — SARS-COV-2 RNA RESP QL NAA+PROBE: NOT DETECTED

## 2024-07-23 PROCEDURE — U0002 COVID-19 LAB TEST NON-CDC: HCPCS | Performed by: NURSE PRACTITIONER

## 2024-07-23 PROCEDURE — 99213 OFFICE O/P EST LOW 20 MIN: CPT | Performed by: NURSE PRACTITIONER

## 2024-07-23 NOTE — DISCHARGE INSTRUCTIONS
Recommend over-the-counter Mucinex for the cough take over-the-counter ibuprofen or Tylenol for pain you may try over-the-counter Flonase and Zyrtec to help with any runny nose congestion or postnasal drip drink plenty of fluids warm tea with honey.  Follow-up with your primary care provider in a week.  If you develop high fever chest pain shortness of breath nausea vomiting diarrhea or any new or worsening symptoms go to the nearest emergency department.

## 2024-07-23 NOTE — ED PROVIDER NOTES
Patient Seen in: [facility]      History     Chief Complaint   Patient presents with    Cough/URI     Stated Complaint: Cough,Chills    Subjective:   HPI    This is a 23-year-old female with no significant past medical history presenting with nasal congestion headache fatigue and cough.  Patient states her symptoms started yesterday her mom is recently been sick.  No fever chest pain shortness of breath nausea vomiting or diarrhea.    Objective:   Past Medical History:    Acne    Conjunctivitis of left eye    per [employer]:  \"Conjunctivitis with plaque, OS\"              History reviewed. No pertinent surgical history.             Social History     Socioeconomic History    Marital status: Single   Tobacco Use    Smoking status: Never    Smokeless tobacco: Never   Vaping Use    Vaping status: Never Used   Substance and Sexual Activity    Alcohol use: No    Drug use: No   Other Topics Concern    Caffeine Concern Yes     Comment: Soda, 2 a day    Pt has a pacemaker No    Pt has a defibrillator No    Reaction to local anesthetic No              Review of Systems    Positive for stated Chief Complaint: Cough/URI    Other systems are as noted in HPI.  Constitutional and vital signs reviewed.      All other systems reviewed and negative except as noted above.    Physical Exam     ED Triage Vitals [07/23/24 1405]   /78   Pulse 65   Resp 16   Temp 97.9 °F (36.6 °C)   Temp src Temporal   SpO2 98 %   O2 Device None (Room air)       Current Vitals:   Vital Signs  BP: 123/78  Pulse: 65  Resp: 16  Temp: 97.9 °F (36.6 °C)  Temp src: Temporal    Oxygen Therapy  SpO2: 98 %  O2 Device: None (Room air)            Physical Exam  Vitals and nursing note reviewed.   Constitutional:       Appearance: Normal appearance.   HENT:      Right Ear: Tympanic membrane normal.      Left Ear: Tympanic membrane normal.      Nose: Congestion present. No rhinorrhea.      Mouth/Throat:      Mouth: Mucous membranes are moist.       Pharynx: Oropharynx is clear. No posterior oropharyngeal erythema.   Eyes:      Conjunctiva/sclera: Conjunctivae normal.   Cardiovascular:      Rate and Rhythm: Normal rate.   Pulmonary:      Effort: Pulmonary effort is normal. No respiratory distress.      Breath sounds: Normal breath sounds. No wheezing.      Comments: + dry cough  Musculoskeletal:         General: Normal range of motion.      Cervical back: Normal range of motion.   Neurological:      Mental Status: She is alert and oriented to person, place, and time.               ED Course     Labs Reviewed   RAPID SARS-COV-2 BY PCR - Normal            MDM                            Medical Decision Making  23-year-old female nontoxic-appearing with sick contacts at home with viral symptoms that started yesterday.  DDx viral URI versus COVID versus another viral or respiratory illness no clinical indication for labs or imaging but she will be swabbed for COVID.    COVID-negative patient made aware of results discussed likely a viral illness possible allergies discussed over-the-counter medications to help with symptoms.  All education instructions including outpatient follow-up placed in discharge paperwork.  Patient acknowledged understanding discharge instructions.    Problems Addressed:  Viral illness: acute illness or injury    Amount and/or Complexity of Data Reviewed  Labs: ordered. Decision-making details documented in ED Course.    Risk  OTC drugs.        Disposition and Plan     Clinical Impression:  1. Viral illness    2. Lab test negative for COVID-19 virus         Disposition:  Discharge  7/23/2024  2:24 pm    Follow-up:  Ron French DO  [address] [place] [id number]  [phone]    In 1 week            Medications Prescribed:  Current Discharge Medication List

## 2024-07-30 ENCOUNTER — HOSPITAL ENCOUNTER (OUTPATIENT)
Age: 24
Discharge: HOME | End: 2024-07-30
Payer: MEDICAID

## 2024-07-30 VITALS
DIASTOLIC BLOOD PRESSURE: 74 MMHG | HEART RATE: 75 BPM | SYSTOLIC BLOOD PRESSURE: 110 MMHG | TEMPERATURE: 100 F | RESPIRATION RATE: 16 BRPM | OXYGEN SATURATION: 97 %

## 2024-07-30 DIAGNOSIS — B97.89 VIRAL RESPIRATORY ILLNESS: Primary | ICD-10-CM

## 2024-07-30 DIAGNOSIS — J98.8 VIRAL RESPIRATORY ILLNESS: Primary | ICD-10-CM

## 2024-07-30 DIAGNOSIS — Z20.822 ENCOUNTER FOR LABORATORY TESTING FOR COVID-19 VIRUS: ICD-10-CM

## 2024-07-30 LAB — SARS-COV-2 RNA RESP QL NAA+PROBE: NOT DETECTED

## 2024-07-30 PROCEDURE — 99213 OFFICE O/P EST LOW 20 MIN: CPT | Performed by: NURSE PRACTITIONER

## 2024-07-30 PROCEDURE — U0002 COVID-19 LAB TEST NON-CDC: HCPCS | Performed by: NURSE PRACTITIONER

## 2024-07-30 NOTE — ED PROVIDER NOTES
Patient Seen in: [facility]    History   CC: covid testing  HPI: Alexander Smith 23 year old female  who presents without complaints however needs a COVID test in order to return to work.  States she was exposed to her mother who had COVID and was sick last week however patient denies symptoms at this time.    Past Medical History:    Acne    Conjunctivitis of left eye    per [employer]:  \"Conjunctivitis with plaque, OS\"       History reviewed. No pertinent surgical history.    Family History   Problem Relation Age of Onset    Diabetes Neg         No Diabetes    Glaucoma Neg         No Glaucoma       Social History     Socioeconomic History    Marital status: Single   Tobacco Use    Smoking status: Never    Smokeless tobacco: Never   Vaping Use    Vaping status: Never Used   Substance and Sexual Activity    Alcohol use: No    Drug use: No   Other Topics Concern    Caffeine Concern Yes     Comment: Soda, 2 a day    Pt has a pacemaker No    Pt has a defibrillator No    Reaction to local anesthetic No       ROS:  Review of Systems    Positive for stated complaint: Covid Testing  Other systems are as noted in HPI.  Constitutional and vital signs reviewed.      All other systems reviewed and negative except as noted above.    PSFH elements reviewed from today and agreed except as otherwise stated in HPI.             Constitutional and vital signs reviewed.        Physical Exam     ED Triage Vitals [07/30/24 1107]   /74   Pulse 75   Resp 16   Temp 99.5 °F (37.5 °C)   Temp src Oral   SpO2 97 %   O2 Device None (Room air)       Current:/74   Pulse 75   Temp 99.5 °F (37.5 °C) (Oral)   Resp 16   LMP 07/01/2024 (Exact Date)   SpO2 97%         PE:  General - Appears well, non-toxic and in NAD  Head - Appears symmetrical without deformity/swelling cranium, scalp, or facial bones  Eyes - sclera not injected, no discharge noted, no periorbital edema  ENT - EAC bilaterally without discharge   Oropharynx  clear, voice is clear.   Neck - supple with trachea midline  Resp - wob unlabored, good aeration with equal, even expansion bilaterally   Skin - no rashes or petechiae noted, pink warm and dry throughout, mmm  Neuro - A&O x4, steady gait  MSK - makes purposeful movements of all extremities.  Psych - Interactive and appropriate      ED Course     Labs Reviewed   RAPID SARS-COV-2 BY PCR - Normal       MDM     DDx: COVID-19 versus viral illness unspecified versus COVID exposure without illness    Rapid COVID PCR negative.  Work note written.  Follow-up and return/ED precautions reviewed. patient is historian and demonstrates understanding of all instruction and agrees with plan of care.      Disposition and Plan     Clinical Impression:  1. Viral respiratory illness    2. Encounter for laboratory testing for COVID-19 virus        Disposition:  Discharge    Follow-up:  Ron French DO  [address] [place] [id number]  [phone]    Go in 1 week  As needed      Medications Prescribed:  Current Discharge Medication List

## 2024-08-19 ENCOUNTER — E-VISIT (OUTPATIENT)
Dept: TELEHEALTH | Age: 24
End: 2024-08-19
Payer: MEDICAID

## 2024-08-19 ENCOUNTER — HOSPITAL ENCOUNTER (OUTPATIENT)
Age: 24
Discharge: HOME | End: 2024-08-19
Payer: MEDICAID

## 2024-08-19 VITALS
DIASTOLIC BLOOD PRESSURE: 71 MMHG | SYSTOLIC BLOOD PRESSURE: 121 MMHG | OXYGEN SATURATION: 99 % | TEMPERATURE: 97 F | RESPIRATION RATE: 18 BRPM | HEART RATE: 72 BPM

## 2024-08-19 DIAGNOSIS — U07.1 COVID: Primary | ICD-10-CM

## 2024-08-19 DIAGNOSIS — R05.9 COUGH, UNSPECIFIED TYPE: Primary | ICD-10-CM

## 2024-08-19 LAB — SARS-COV-2 RNA RESP QL NAA+PROBE: DETECTED

## 2024-08-19 PROCEDURE — 99213 OFFICE O/P EST LOW 20 MIN: CPT | Performed by: NURSE PRACTITIONER

## 2024-08-19 PROCEDURE — U0002 COVID-19 LAB TEST NON-CDC: HCPCS | Performed by: NURSE PRACTITIONER

## 2024-08-19 NOTE — ED PROVIDER NOTES
He    Patient Seen in: [facility]      History     Chief Complaint   Patient presents with    Cold     Stated Complaint: Cold  Subjective:   24-year-old healthy female presents for URI symptoms with chills, body aches, and headache that started 3 days ago.  No chest pain or difficulty breathing.  No fevers.  She is eating and drinking without vomiting or diarrhea.  No sore throat.  She does have a generalized headache.  No neck stiffness.  No rashes.  No dizziness.  No known exposure to sick contacts.  She is not vaccinated for COVID.  She appears nontoxic.      Objective:   Past Medical History:    Acne    Conjunctivitis of left eye    per [employer]:  \"Conjunctivitis with plaque, OS\"            History reviewed. No pertinent surgical history.           Social History     Socioeconomic History    Marital status: Single   Tobacco Use    Smoking status: Never    Smokeless tobacco: Never   Vaping Use    Vaping status: Never Used   Substance and Sexual Activity    Alcohol use: No    Drug use: No   Other Topics Concern    Caffeine Concern Yes     Comment: Soda, 2 a day    Pt has a pacemaker No    Pt has a defibrillator No    Reaction to local anesthetic No            Review of Systems    Positive for stated complaint: Cold     Other systems are as noted in HPI.  Constitutional and vital signs reviewed.      All other systems reviewed and negative except as noted above.    Physical Exam     ED Triage Vitals [08/19/24 0835]   /71   Pulse 72   Resp 18   Temp 97.1 °F (36.2 °C)   Temp src Temporal   SpO2 99 %   O2 Device None (Room air)     Current:/71   Pulse 72   Temp 97.1 °F (36.2 °C) (Temporal)   Resp 18   LMP 08/12/2024 (Exact Date)   SpO2 99%     Physical Exam  Vitals and nursing note reviewed.   Constitutional:       General: She is not in acute distress.     Appearance: Normal appearance. She is not toxic-appearing.   HENT:      Head: Normocephalic.      Right Ear: Tympanic membrane normal.       Left Ear: Tympanic membrane normal.      Nose: Congestion present. No rhinorrhea.      Mouth/Throat:      Mouth: Mucous membranes are moist.      Pharynx: Oropharynx is clear. No oropharyngeal exudate or posterior oropharyngeal erythema.   Eyes:      Conjunctiva/sclera: Conjunctivae normal.      Pupils: Pupils are equal, round, and reactive to light.   Cardiovascular:      Rate and Rhythm: Normal rate and regular rhythm.   Pulmonary:      Effort: Pulmonary effort is normal.      Breath sounds: Normal breath sounds. No wheezing, rhonchi or rales.   Musculoskeletal:         General: Normal range of motion.      Cervical back: Normal range of motion and neck supple.   Lymphadenopathy:      Cervical: No cervical adenopathy.   Skin:     General: Skin is warm and dry.      Capillary Refill: Capillary refill takes less than 2 seconds.      Findings: No rash.   Neurological:      General: No focal deficit present.      Mental Status: She is alert and oriented to person, place, and time.   Psychiatric:         Mood and Affect: Mood normal.         Behavior: Behavior normal.         ED Course   No results found.  Labs Reviewed   RAPID SARS-COV-2 BY PCR - Abnormal; Notable for the following components:       Result Value    Rapid SARS-CoV-2 by PCR Detected (*)     All other components within normal limits       MDM     Medical Decision Making  The COVID test is positive.  The patient is aware.  We discussed supportive care measures including pushing fluids, rest, and Tylenol or Motrin as needed for pain or fever.  She will follow-up as needed with her primary care doctor.    Amount and/or Complexity of Data Reviewed  Labs: ordered.     Details: COVID-positive    Risk  OTC drugs.  Risk Details: URI versus COVID        Disposition and Plan     Clinical Impression:  1. COVID         Disposition:  Discharge  8/19/2024  8:55 am    Follow-up:  Ron French DO  [address] [place] [address]  [id number]  [phone]    Schedule an appointment as soon as possible for a visit   As needed          Medications Prescribed:  Discharge Medication List as of 8/19/2024  8:57 AM

## 2024-08-24 NOTE — PROGRESS NOTES
HPI:  Alexander Smith is a 24 year old female who presents for an evisit.  See [employer] communications above.    Current Outpatient Medications   Medication Sig Dispense Refill    mupirocin 2 % External Ointment Apply to affect lesions tid 22 g 0    Estradiol Valerate-Dienogest 3/2-2/2-3/1 MG Oral Tab Take 1 tablet by mouth daily. (Patient not taking: Reported on 7/9/2024) 28 tablet 12    Cholecalciferol (VITAMIN D3) 1.25 MG (86974 UT) Oral Cap Take 1 capsule by mouth once a week.      montelukast (SINGULAIR) 10 MG Oral Tab Take 1 tablet (10 mg total) by mouth daily. (Patient not taking: Reported on 1/19/2024) 90 tablet 3     Past Medical History:    Acne    Conjunctivitis of left eye    per [employer]:  \"Conjunctivitis with plaque, OS\"     No past surgical history on file.    Social History     Socioeconomic History    Marital status: Single   Tobacco Use    Smoking status: Never    Smokeless tobacco: Never   Vaping Use    Vaping status: Never Used   Substance and Sexual Activity    Alcohol use: No    Drug use: No   Other Topics Concern    Caffeine Concern Yes     Comment: Soda, 2 a day    Pt has a pacemaker No    Pt has a defibrillator No    Reaction to local anesthetic No          No results found for this or any previous visit (from the past 24 hour(s)).    ASSESSMENT AND PLAN:      ASSESSMENT:   Encounter Diagnosis   Name Primary?    Cough, unspecified type Yes       PLAN: Meds as below.  See patient Instructions  -Total of 5 minutes spent with patient.  Patient did not respond and subsequently seen at the .    Meds & Refills for this Visit:  Requested Prescriptions      No prescriptions requested or ordered in this encounter       Risks, benefits, and side effects of medication explained and discussed.    There are no Patient Instructions on file for this visit.    The patient indicates understanding of these issues and agrees to the plan.  See attached patient references.  The patient is asked to return if sx's  persist or worsen.    Alexander Smith understands evisit evaluation is not a substitute for face-to-face examination or emergency care. Patient advised to go to ER or call 911 for worsening symptoms or acute distress.

## 2024-09-17 ENCOUNTER — HOSPITAL ENCOUNTER (EMERGENCY)
Facility: HOSPITAL | Age: 24
Discharge: HOME | End: 2024-09-17
Attending: EMERGENCY MEDICINE
Payer: MEDICAID

## 2024-09-17 VITALS
SYSTOLIC BLOOD PRESSURE: 125 MMHG | HEIGHT: 64 IN | HEART RATE: 78 BPM | DIASTOLIC BLOOD PRESSURE: 80 MMHG | OXYGEN SATURATION: 98 % | TEMPERATURE: 98 F | RESPIRATION RATE: 16 BRPM | WEIGHT: 186 LBS | BODY MASS INDEX: 31.76 KG/M2

## 2024-09-17 DIAGNOSIS — F41.9 ANXIETY: ICD-10-CM

## 2024-09-17 DIAGNOSIS — N92.0 MENORRHAGIA WITH REGULAR CYCLE: Primary | ICD-10-CM

## 2024-09-17 LAB
ANION GAP SERPL CALC-SCNC: 4 MMOL/L (ref 0–18)
B-HCG UR QL: NEGATIVE
BASOPHILS # BLD AUTO: 0.04 X10(3) UL (ref 0–0.2)
BASOPHILS NFR BLD AUTO: 0.9 %
BILIRUB UR QL: NEGATIVE
BUN BLD-MCNC: 17 MG/DL (ref 9–23)
BUN/CREAT SERPL: 24.3 (ref 10–20)
CALCIUM BLD-MCNC: 9.1 MG/DL (ref 8.7–10.4)
CHLORIDE SERPL-SCNC: 110 MMOL/L (ref 98–112)
CO2 SERPL-SCNC: 26 MMOL/L (ref 21–32)
COLOR UR: COLORLESS
CREAT BLD-MCNC: 0.7 MG/DL
DEPRECATED RDW RBC AUTO: 41.2 FL (ref 35.1–46.3)
EGFRCR SERPLBLD CKD-EPI 2021: 123 ML/MIN/1.73M2 (ref 60–?)
EOSINOPHIL # BLD AUTO: 0.09 X10(3) UL (ref 0–0.7)
EOSINOPHIL NFR BLD AUTO: 2 %
ERYTHROCYTE [DISTWIDTH] IN BLOOD BY AUTOMATED COUNT: 12.5 % (ref 11–15)
GLUCOSE BLD-MCNC: 98 MG/DL (ref 70–99)
GLUCOSE UR-MCNC: NORMAL MG/DL
HCT VFR BLD AUTO: 37.7 %
HGB BLD-MCNC: 12.9 G/DL
IMM GRANULOCYTES # BLD AUTO: 0.01 X10(3) UL (ref 0–1)
IMM GRANULOCYTES NFR BLD: 0.2 %
KETONES UR-MCNC: NEGATIVE MG/DL
LEUKOCYTE ESTERASE UR QL STRIP.AUTO: 25
LYMPHOCYTES # BLD AUTO: 1.56 X10(3) UL (ref 1–4)
LYMPHOCYTES NFR BLD AUTO: 33.9 %
MAGNESIUM SERPL-MCNC: 1.9 MG/DL (ref 1.6–2.6)
MCH RBC QN AUTO: 31.2 PG (ref 26–34)
MCHC RBC AUTO-ENTMCNC: 34.2 G/DL (ref 31–37)
MCV RBC AUTO: 91.1 FL
MONOCYTES # BLD AUTO: 0.37 X10(3) UL (ref 0.1–1)
MONOCYTES NFR BLD AUTO: 8 %
NEUTROPHILS # BLD AUTO: 2.53 X10 (3) UL (ref 1.5–7.7)
NEUTROPHILS # BLD AUTO: 2.53 X10(3) UL (ref 1.5–7.7)
NEUTROPHILS NFR BLD AUTO: 55 %
NITRITE UR QL STRIP.AUTO: NEGATIVE
OSMOLALITY SERPL CALC.SUM OF ELEC: 292 MOSM/KG (ref 275–295)
PH UR: 7 [PH] (ref 5–8)
PLATELET # BLD AUTO: 205 10(3)UL (ref 150–450)
POTASSIUM SERPL-SCNC: 3.8 MMOL/L (ref 3.5–5.1)
PROT UR-MCNC: NEGATIVE MG/DL
RBC # BLD AUTO: 4.14 X10(6)UL
SODIUM SERPL-SCNC: 140 MMOL/L (ref 136–145)
SP GR UR STRIP: 1.02 (ref 1–1.03)
UROBILINOGEN UR STRIP-ACNC: 2
WBC # BLD AUTO: 4.6 X10(3) UL (ref 4–11)

## 2024-09-17 PROCEDURE — 81001 URINALYSIS AUTO W/SCOPE: CPT | Performed by: EMERGENCY MEDICINE

## 2024-09-17 PROCEDURE — 85025 COMPLETE CBC W/AUTO DIFF WBC: CPT | Performed by: EMERGENCY MEDICINE

## 2024-09-17 PROCEDURE — 81025 URINE PREGNANCY TEST: CPT

## 2024-09-17 PROCEDURE — 99284 EMERGENCY DEPT VISIT MOD MDM: CPT

## 2024-09-17 PROCEDURE — 83735 ASSAY OF MAGNESIUM: CPT | Performed by: EMERGENCY MEDICINE

## 2024-09-17 PROCEDURE — 96360 HYDRATION IV INFUSION INIT: CPT

## 2024-09-17 PROCEDURE — 87086 URINE CULTURE/COLONY COUNT: CPT | Performed by: EMERGENCY MEDICINE

## 2024-09-17 PROCEDURE — 80048 BASIC METABOLIC PNL TOTAL CA: CPT | Performed by: EMERGENCY MEDICINE

## 2024-09-17 RX ORDER — ALPRAZOLAM 0.5 MG
0.5 TABLET ORAL ONCE
Status: COMPLETED | OUTPATIENT
Start: 2024-09-17 | End: 2024-09-17

## 2024-09-17 NOTE — ED PROVIDER NOTES
Patient Seen in: [facility] Emergency Department    History     Chief Complaint   Patient presents with    Eval-G       HPI    24-year-old female presents the ER with complaints of weakness as well as anxiety and heavy vaginal bleeding.  Patient currently on her menstrual states that she feels like she is lost a lot of blood.  Patient states she changed her pad 3 times a day.  Patient's vital signs stable.  Patient also states she feels very anxious which could be contributing to her symptoms    History reviewed.   Past Medical History:    Acne    Conjunctivitis of left eye    per [employer]:  \"Conjunctivitis with plaque, OS\"       History reviewed. History reviewed. No pertinent surgical history.      Medications :  (Not in a hospital admission)       Family History   Problem Relation Age of Onset    Diabetes Neg         No Diabetes    Glaucoma Neg         No Glaucoma       Smoking Status:   Social History     Socioeconomic History    Marital status: Single   Tobacco Use    Smoking status: Never    Smokeless tobacco: Never   Vaping Use    Vaping status: Never Used   Substance and Sexual Activity    Alcohol use: No    Drug use: No   Other Topics Concern    Caffeine Concern Yes     Comment: Soda, 2 a day    Pt has a pacemaker No    Pt has a defibrillator No    Reaction to local anesthetic No       ROS  All pertinent positives for the review of systems are mentioned in the HPI  All other organ systems are reviewed and are negative.    Constitutional and vital signs reviewed.      Social History and Family History elements reviewed from today, pertinent positives to the presenting problem noted.    Physical Exam     ED Triage Vitals [09/17/24 0918]   /80   Pulse 78   Resp 16   Temp 98 °F (36.7 °C)   Temp src Temporal   SpO2 98 %   O2 Device None (Room air)       All measures to prevent infection transmission during my interaction with the patient were taken. The patient was already wearing a droplet mask on  my arrival to the room. Personal protective equipment including droplet mask, eye protection, and gloves were worn throughout the duration of the exam.  Handwashing was performed prior to and after the exam.  Stethoscope and any equipment used during my examination was cleaned with super sani-cloth germicidal wipes following the exam.     Physical Exam  Vitals and nursing note reviewed.   Constitutional:       Appearance: She is well-developed.   HENT:      Head: Normocephalic and atraumatic.      Right Ear: External ear normal.      Left Ear: External ear normal.      Nose: Nose normal.   Eyes:      Conjunctiva/sclera: Conjunctivae normal.      Pupils: Pupils are equal, round, and reactive to light.   Cardiovascular:      Rate and Rhythm: Normal rate and regular rhythm.      Heart sounds: Normal heart sounds.   Pulmonary:      Effort: Pulmonary effort is normal.      Breath sounds: Normal breath sounds.   Abdominal:      General: Bowel sounds are normal.      Palpations: Abdomen is soft.   Musculoskeletal:         General: Normal range of motion.      Cervical back: Normal range of motion and neck supple.   Skin:     General: Skin is warm and dry.   Neurological:      General: No focal deficit present.      Mental Status: She is alert and oriented to person, place, and time.      Cranial Nerves: No cranial nerve deficit.      Sensory: No sensory deficit.      Motor: No weakness.      Coordination: Coordination normal.      Deep Tendon Reflexes: Reflexes are normal and symmetric.   Psychiatric:         Behavior: Behavior normal.         Thought Content: Thought content normal.         Judgment: Judgment normal.         ED Course        Labs Reviewed   BASIC METABOLIC PANEL (8) - Abnormal; Notable for the following components:       Result Value    BUN/CREA Ratio 24.3 (*)     All other components within normal limits   MAGNESIUM - Normal   POCT PREGNANCY URINE - Normal   CBC WITH DIFFERENTIAL WITH PLATELET    URINALYSIS WITH CULTURE REFLEX         Imaging Results Available and Reviewed while in ED: No results found.  ED Medications Administered:   Medications   sodium chloride 0.9 % IV bolus 1,000 mL (1,000 mL Intravenous New Bag 9/17/24 0935)   ALPRAZolam (Xanax) tab 0.5 mg (0.5 mg Oral Given 9/17/24 1018)         MDM     Vitals:    09/17/24 0918   BP: 125/80   Pulse: 78   Resp: 16   Temp: 98 °F (36.7 °C)   TempSrc: Temporal   SpO2: 98%   Weight: 84.4 kg   Height: 162.6 cm (5' 4\")     *I personally reviewed and interpreted all ED vitals.  I also personally reviewed all labs and imaging if ordered    Pulse Ox: 98%, Room air, Normal     Monitor Interpretation:   normal sinus rhythm    Differential Diagnosis/ Diagnostic Considerations: Anemia, vaginal bleeding, anxiety, electrolyte abnormality    Medical Record Review: I personally reviewed available prior medical records for any recent pertinent discharge summaries, testing, and procedures and reviewed those reports.    Complicating Factors: The patient already has does not have any pertinent problems on file. to contribute to the complexity of this ED evaluation.    Medical Decision Making  24-year-old female presents ER with complaint of heavy vaginal bleeding.  Patient states that she got 1 L of IV fluid as well as axis that she feels better.  Patient blood work within normal limits.  Patient struck to follow-up with her PCP if she continues to have heavy vaginal bleeding for possible birth control maintenance.  Patient is mother bedside made aware of the discharge planning disposition per    Problems Addressed:  Anxiety: acute illness or injury  Menorrhagia with regular cycle: acute illness or injury    Amount and/or Complexity of Data Reviewed  Independent Historian:      Details: Patient's mother states that she feels anxious because of her heavy vaginal bleeding.  Patient also complaining of weakness  Labs: ordered. Decision-making details documented in ED  Course.        Condition upon leaving the department: Stable    Disposition and Plan     Clinical Impression:  1. Menorrhagia with regular cycle    2. Anxiety        Disposition:  Discharge    Follow-up:  Ron French DO  [address] [place] [address] [id number] [phone]    Schedule an appointment as soon as possible for a visit  If symptoms worsen      Medications Prescribed:  Current Discharge Medication List

## 2024-12-18 RX ORDER — FOLIC ACID 1 MG/1
1 TABLET ORAL WEEKLY
Qty: 1 CAPSULE | Refills: 0 | Status: CANCELLED | OUTPATIENT
Start: 2024-12-18

## 2024-12-24 NOTE — TELEPHONE ENCOUNTER
Please review.  Protocol failed / No protocol.    Requested Prescriptions   Pending Prescriptions Disp Refills    Cholecalciferol (VITAMIN D3) 1.25 MG (35666 UT) Oral Cap 1 capsule 0     Sig: Take 1 capsule by mouth once a week.       There is no refill protocol information for this order              Recent Outpatient Visits              4 months ago Cough, unspecified type    [facility], Virtual Visit Teresa Carlos PA    E-Visit    5 months ago Skin tags, multiple acquired    [facility], [facility], Lynda Perez APRN    Office Visit    11 months [facility]    [facility]    1 year ago Seasonal [facility] due [facility]    [facility]    1 year ago Well woman exam with routine gynecological exam    [facility]    [facility] Visit

## 2024-12-31 ENCOUNTER — HOSPITAL ENCOUNTER (EMERGENCY)
Facility: HOSPITAL | Age: 24
Discharge: HOME | End: 2024-12-31
Attending: EMERGENCY MEDICINE
Payer: MEDICAID

## 2024-12-31 VITALS
OXYGEN SATURATION: 96 % | SYSTOLIC BLOOD PRESSURE: 122 MMHG | BODY MASS INDEX: 32.44 KG/M2 | HEIGHT: 64 IN | TEMPERATURE: 99 F | RESPIRATION RATE: 16 BRPM | HEART RATE: 97 BPM | DIASTOLIC BLOOD PRESSURE: 78 MMHG | WEIGHT: 190 LBS

## 2024-12-31 DIAGNOSIS — R11.2 NAUSEA AND VOMITING IN ADULT: Primary | ICD-10-CM

## 2024-12-31 DIAGNOSIS — R19.7 DIARRHEA, UNSPECIFIED TYPE: ICD-10-CM

## 2024-12-31 LAB
ALBUMIN SERPL-MCNC: 4.8 G/DL (ref 3.2–4.8)
ALP LIVER SERPL-CCNC: 77 U/L
ALT SERPL-CCNC: 42 U/L
ANION GAP SERPL CALC-SCNC: 9 MMOL/L (ref 0–18)
AST SERPL-CCNC: 31 U/L (ref ?–34)
B-HCG UR QL: NEGATIVE
BASOPHILS # BLD AUTO: 0.04 X10(3) UL (ref 0–0.2)
BASOPHILS NFR BLD AUTO: 0.3 %
BILIRUB DIRECT SERPL-MCNC: 0.4 MG/DL (ref ?–0.3)
BILIRUB SERPL-MCNC: 1.2 MG/DL (ref 0.3–1.2)
BILIRUB UR QL: NEGATIVE
BUN BLD-MCNC: 15 MG/DL (ref 9–23)
BUN/CREAT SERPL: 18.5 (ref 10–20)
CALCIUM BLD-MCNC: 9.2 MG/DL (ref 8.7–10.4)
CHLORIDE SERPL-SCNC: 105 MMOL/L (ref 98–112)
CLARITY UR: CLEAR
CO2 SERPL-SCNC: 23 MMOL/L (ref 21–32)
COLOR UR: YELLOW
CREAT BLD-MCNC: 0.81 MG/DL
DEPRECATED RDW RBC AUTO: 45.1 FL (ref 35.1–46.3)
EGFRCR SERPLBLD CKD-EPI 2021: 103 ML/MIN/1.73M2 (ref 60–?)
EOSINOPHIL # BLD AUTO: 0.05 X10(3) UL (ref 0–0.7)
EOSINOPHIL NFR BLD AUTO: 0.4 %
ERYTHROCYTE [DISTWIDTH] IN BLOOD BY AUTOMATED COUNT: 13.3 % (ref 11–15)
GLUCOSE BLD-MCNC: 121 MG/DL (ref 70–99)
GLUCOSE UR-MCNC: NORMAL MG/DL
HCT VFR BLD AUTO: 44.7 %
HGB BLD-MCNC: 15.1 G/DL
IMM GRANULOCYTES # BLD AUTO: 0.04 X10(3) UL (ref 0–1)
IMM GRANULOCYTES NFR BLD: 0.3 %
KETONES UR-MCNC: NEGATIVE MG/DL
LEUKOCYTE ESTERASE UR QL STRIP.AUTO: 250
LIPASE SERPL-CCNC: 51 U/L (ref 12–53)
LYMPHOCYTES # BLD AUTO: 0.38 X10(3) UL (ref 1–4)
LYMPHOCYTES NFR BLD AUTO: 2.9 %
MCH RBC QN AUTO: 31.2 PG (ref 26–34)
MCHC RBC AUTO-ENTMCNC: 33.8 G/DL (ref 31–37)
MCV RBC AUTO: 92.4 FL
MONOCYTES # BLD AUTO: 0.51 X10(3) UL (ref 0.1–1)
MONOCYTES NFR BLD AUTO: 3.9 %
NEUTROPHILS # BLD AUTO: 11.97 X10 (3) UL (ref 1.5–7.7)
NEUTROPHILS # BLD AUTO: 11.97 X10(3) UL (ref 1.5–7.7)
NEUTROPHILS NFR BLD AUTO: 92.2 %
NITRITE UR QL STRIP.AUTO: NEGATIVE
OSMOLALITY SERPL CALC.SUM OF ELEC: 286 MOSM/KG (ref 275–295)
PH UR: 5.5 [PH] (ref 5–8)
PLATELET # BLD AUTO: 167 10(3)UL (ref 150–450)
POTASSIUM SERPL-SCNC: 3.7 MMOL/L (ref 3.5–5.1)
PROT SERPL-MCNC: 7.4 G/DL (ref 5.7–8.2)
PROT UR-MCNC: 30 MG/DL
RBC # BLD AUTO: 4.84 X10(6)UL
SODIUM SERPL-SCNC: 137 MMOL/L (ref 136–145)
SP GR UR STRIP: 1.03 (ref 1–1.03)
UROBILINOGEN UR STRIP-ACNC: NORMAL
WBC # BLD AUTO: 13 X10(3) UL (ref 4–11)

## 2024-12-31 PROCEDURE — 80076 HEPATIC FUNCTION PANEL: CPT | Performed by: EMERGENCY MEDICINE

## 2024-12-31 PROCEDURE — 96374 THER/PROPH/DIAG INJ IV PUSH: CPT

## 2024-12-31 PROCEDURE — 80048 BASIC METABOLIC PNL TOTAL CA: CPT | Performed by: EMERGENCY MEDICINE

## 2024-12-31 PROCEDURE — 96375 TX/PRO/DX INJ NEW DRUG ADDON: CPT

## 2024-12-31 PROCEDURE — 87086 URINE CULTURE/COLONY COUNT: CPT | Performed by: EMERGENCY MEDICINE

## 2024-12-31 PROCEDURE — 83690 ASSAY OF LIPASE: CPT | Performed by: EMERGENCY MEDICINE

## 2024-12-31 PROCEDURE — 81001 URINALYSIS AUTO W/SCOPE: CPT | Performed by: EMERGENCY MEDICINE

## 2024-12-31 PROCEDURE — 99284 EMERGENCY DEPT VISIT MOD MDM: CPT

## 2024-12-31 PROCEDURE — S0028 INJECTION, FAMOTIDINE, 20 MG: HCPCS | Performed by: EMERGENCY MEDICINE

## 2024-12-31 PROCEDURE — 96361 HYDRATE IV INFUSION ADD-ON: CPT

## 2024-12-31 PROCEDURE — 81025 URINE PREGNANCY TEST: CPT

## 2024-12-31 PROCEDURE — 85025 COMPLETE CBC W/AUTO DIFF WBC: CPT | Performed by: EMERGENCY MEDICINE

## 2024-12-31 RX ORDER — DICYCLOMINE HCL 20 MG
20 TABLET ORAL 4 TIMES DAILY PRN
Qty: 30 TABLET | Refills: 0 | Status: SHIPPED | OUTPATIENT
Start: 2024-12-31 | End: 2025-01-30

## 2024-12-31 RX ORDER — ONDANSETRON 2 MG/ML
4 INJECTION INTRAMUSCULAR; INTRAVENOUS ONCE
Status: COMPLETED | OUTPATIENT
Start: 2024-12-31 | End: 2024-12-31

## 2024-12-31 RX ORDER — FAMOTIDINE 20 MG/1
20 TABLET, FILM COATED ORAL 2 TIMES DAILY
Qty: 20 TABLET | Refills: 0 | Status: SHIPPED | OUTPATIENT
Start: 2024-12-31

## 2024-12-31 RX ORDER — FAMOTIDINE 10 MG/ML
20 INJECTION, SOLUTION INTRAVENOUS ONCE
Status: COMPLETED | OUTPATIENT
Start: 2024-12-31 | End: 2024-12-31

## 2024-12-31 RX ORDER — ONDANSETRON 4 MG/1
4 TABLET, ORALLY DISINTEGRATING ORAL EVERY 4 HOURS PRN
Qty: 10 TABLET | Refills: 0 | Status: SHIPPED | OUTPATIENT
Start: 2024-12-31 | End: 2025-01-07

## 2025-01-01 NOTE — ED INITIAL ASSESSMENT (HPI)
Pt to ED with c/o vomiting x5 hours after eating soup. Pt denies abdominal pain.  Pt unable to tolerate food or drink.

## 2025-01-01 NOTE — ED PROVIDER NOTES
[facility]  Emergency Department Attending Note     Chief Complaint:   Chief Complaint   Patient presents with    Vomiting     HISTORY OF PRESENT ILLNESS:   Alexander Smith is a 24 year old female who presents to the ED without significant past medical history presents with a complaint of nausea vomiting and diarrhea over the last several hours.  Denies any abdominal pain.  Denies any chest pain or dyspnea.  Denies any pleuritic or exertional component to the discomfort.  Denies any mid emesis hematochezia or melena.  No dysuria hematuria urgency or frequency.  Started after eating some soup that she thinks had gone bad     MEDICAL & SOCIAL HISTORY:   Past Medical History:    Acne    Conjunctivitis of left eye    per [employer]:  \"Conjunctivitis with plaque, OS\"    No past surgical history on file.   Social History     Socioeconomic History    Marital status: Single   Tobacco Use    Smoking status: Never    Smokeless tobacco: Never   Vaping Use    Vaping status: Never Used   Substance and Sexual Activity    Alcohol use: No    Drug use: No   Other Topics Concern    Caffeine Concern Yes     Comment: Soda, 2 a day    Pt has a pacemaker No    Pt has a defibrillator No    Reaction to local anesthetic No    Allergies[1]   Current Outpatient Medications   Medication Sig Dispense Refill    ondansetron 4 MG Oral Tablet Dispersible Take 1 tablet (4 mg total) by mouth every 4 (four) hours as needed for Nausea. 10 tablet 0    dicyclomine 20 MG Oral Tab Take 1 tablet (20 mg total) by mouth 4 (four) times daily as needed. 30 tablet 0    famotidine 20 MG Oral Tab Take 1 tablet (20 mg total) by mouth 2 (two) times daily. 20 tablet 0    mupirocin 2 % External Ointment Apply to affect lesions tid 22 g 0    Estradiol Valerate-Dienogest 3/2-2/2-3/1 MG Oral Tab Take 1 tablet by mouth daily. (Patient not taking: Reported on 7/9/2024) 28 tablet 12    Cholecalciferol (VITAMIN D3) 1.25 MG (33095 UT) Oral Cap Take 1 capsule by mouth once  a week.            REVIEW OF SYSTEMS   A 10 point review of systems was completed and is negative except as listed in history of present illness      PHYSICAL EXAM   Vitals: /78   Pulse 97   Temp 98.9 °F (37.2 °C) (Temporal)   Resp 16   Ht 162.6 cm (5' 4\")   Wt 86.2 kg   LMP 08/12/2024 (Exact Date)   SpO2 96%   BMI 32.61 kg/m²   /78   Pulse 97   Temp 98.9 °F (37.2 °C) (Temporal)   Resp 16   Ht 162.6 cm (5' 4\")   Wt 86.2 kg   LMP 08/12/2024 (Exact Date)   SpO2 96%   BMI 32.61 kg/m²     General: A&Ox3, NAD  Constitutional: Well developed, well nourished, nontoxic  Head: atraumatic, normocephalic   Eyes: conjuctiva clear, no icterus, PERRL, EOMI, vision grossly normal  Ears: normal external appearance, no drainage  Nose:  Atraumatic, no swelling, no drainage, nares patent  Throat:  Moist pink mucous membranes, airway is patent  Neck:  Soft supple, no masses, no tracheal deviation, no stridor  Chest:  No bruising or abrasions, no tenderness, no deformity  Cardiac:  Regular rate and rhythm  Lung:  No distress, no retractions. Clear to auscultation bilaterally, no w/r/r  Abdomen:  Soft, slightly tender to palpation to the left upper quadrant without rebound or guarding rigidity or pulsatile mass negative not sonographic Granados sign, nondistended, normal BS  Back: No stepoff/deformity  Extremities: FROM all ext, no deformities, intact equal peripheral pulses, no cyanosis or edema  Neuro: No facial droop, no slurred speech, moving all extremities freely, SILT to the bilateral upper and lower extremities  Psych: A&Ox3, normal affect, cooperative, calm  Skin: No rash, no petechiae/purpura, warm, dry      RESULTS  LABS:   Results for orders placed or performed during the hospital encounter of 12/31/24   Basic Metabolic Panel (8)    Collection Time: 12/31/24 10:35 PM   Result Value Ref Range    Glucose 121 (H) 70 - 99 mg/dL    Sodium 137 136 - 145 mmol/L    Potassium 3.7 3.5 - 5.1 mmol/L    Chloride  105 98 - 112 mmol/L    CO2 23.0 21.0 - 32.0 mmol/L    Anion Gap 9 0 - 18 mmol/L    BUN 15 9 - 23 mg/dL    Creatinine 0.81 0.55 - 1.02 mg/dL    BUN/CREA Ratio 18.5 10.0 - 20.0    Calcium, Total 9.2 8.7 - 10.4 mg/dL    Calculated Osmolality 286 275 - 295 mOsm/kg    eGFR-Cr 103 >=60 mL/min/1.73m2   Hepatic Function Panel (7)    Collection Time: 12/31/24 10:35 PM   Result Value Ref Range    AST 31 <34 U/L    ALT 42 10 - 49 U/L    Alkaline Phosphatase 77 37 - 98 U/L    Bilirubin, Total 1.2 0.3 - 1.2 mg/dL    Bilirubin, Direct 0.4 (H) <=0.3 mg/dL    Total Protein 7.4 5.7 - 8.2 g/dL    Albumin 4.8 3.2 - 4.8 g/dL   CBC With Differential With Platelet    Collection Time: 12/31/24 10:35 PM   Result Value Ref Range    WBC 13.0 (H) 4.0 - 11.0 x10(3) uL    RBC 4.84 3.80 - 5.30 x10(6)uL    HGB 15.1 12.0 - 16.0 g/dL    HCT 44.7 35.0 - 48.0 %    MCV 92.4 80.0 - 100.0 fL    MCH 31.2 26.0 - 34.0 pg    MCHC 33.8 31.0 - 37.0 g/dL    RDW-SD 45.1 35.1 - 46.3 fL    RDW 13.3 11.0 - 15.0 %    .0 150.0 - 450.0 10(3)uL    Neutrophil Absolute Prelim 11.97 (H) 1.50 - 7.70 x10 (3) uL    Neutrophil Absolute 11.97 (H) 1.50 - 7.70 x10(3) uL    Lymphocyte Absolute 0.38 (L) 1.00 - 4.00 x10(3) uL    Monocyte Absolute 0.51 0.10 - 1.00 x10(3) uL    Eosinophil Absolute 0.05 0.00 - 0.70 x10(3) uL    Basophil Absolute 0.04 0.00 - 0.20 x10(3) uL    Immature Granulocyte Absolute 0.04 0.00 - 1.00 x10(3) uL    Neutrophil % 92.2 %    Lymphocyte % 2.9 %    Monocyte % 3.9 %    Eosinophil % 0.4 %    Basophil % 0.3 %    Immature Granulocyte % 0.3 %   Lipase    Collection Time: 12/31/24 10:35 PM   Result Value Ref Range    Lipase 51 12 - 53 U/L   Urinalysis with Culture Reflex    Collection Time: 12/31/24 10:35 PM    Specimen: Urine, clean catch   Result Value Ref Range    Urine Color Yellow Yellow    Clarity Urine Clear Clear    Spec Gravity 1.030 1.005 - 1.030    Glucose Urine Normal Normal mg/dL    Bilirubin Urine Negative Negative    Ketones Urine  Negative Negative mg/dL    Blood Urine Trace (A) Negative    pH Urine 5.5 5.0 - 8.0    Protein Urine 30 (A) Negative mg/dL    Urobilinogen Urine Normal Normal    Nitrite Urine Negative Negative    Leukocyte Esterase Urine 250 (A) Negative    WBC Urine 1-5 0 - 5 /HPF    RBC Urine 3-5 (A) 0 - 2 /HPF    Bacteria Urine Rare (A) None Seen /HPF    Squamous Epi. Cells Few (A) None Seen /HPF    Renal Tubular Epithelial Cells None Seen None Seen /HPF    Transitional Cells None Seen None Seen /HPF    Yeast Urine None Seen None Seen /HPF   POCT Pregnancy, Urine    Collection Time: 12/31/24 10:45 PM   Result Value Ref Range    POCT Urine Pregnancy Negative Negative         IMAGING: No results found.      Procedures:   Procedures         ED COURSE          Re-Evaluation: Improved      Disposition & Plan:   Clinical Impression/Final Diagnosis:   1. Nausea and vomiting in adult    2. Diarrhea, unspecified type        Medical Decision Making: This patient presents with abdominal discomfort of uncertain etiology. The evaluation has not  identified a emergent etiology for the symptoms. Specifically, given the very benign exam, normal labs,  and lack of significant risk factors, I have very low suspicion for appendicitis, ischemic gut, perforation or  other life threatening disease.  I have discussed with the patient the level of uncertainty with undifferentiated abdominal discomfort  and explained the need to follow-up as noted on the discharge instructions, or return immediately if the  pain worsens, fever, vomiting, or for any new symptoms or concerns.    Urinalysis noted will send for culture patient denies urinary symptoms.  Offered CT patient states that she does not want this test given the risk of radiation.  Feels better tolerating p.o.    Medical Decision Making  Amount and/or Complexity of Data Reviewed  Independent Historian: friend  External Data Reviewed: notes.  Labs: ordered. Decision-making details documented in ED  Course.  Radiology:  Decision-making details documented in ED Course.    Risk  OTC drugs.  Prescription drug management.  Decision regarding hospitalization.        *Please note that in the presenting to the emergency department, illness/injury that poses a threat to life or function is considered during this patient's initial evaluation.    The complexity of this visit is therefore inherently more complex given the need to consider life threatening pathology prior to any other etiology for this patient's visit.    The medical decision above exemplifies this rationale.     Disposition: Discharge  There are no disposition comments on file for this visit.     This note was generated in part using voice recognition dictation technology. The report was reviewed by this physician but still may have unintentional errors due to inherent limitations of voice recognition technology. All times are estimates.         [1]   Allergies  Allergen Reactions    Cat Hair Extract HIVES, ITCHING, SHORTNESS OF BREATH, SWELLING and WHEEZING    Horse Allergy HIVES, ITCHING, RASH, SWELLING and WHEEZING

## 2025-02-04 ENCOUNTER — APPOINTMENT (OUTPATIENT)
Dept: GENERAL RADIOLOGY | Age: 25
End: 2025-02-04
Attending: NURSE PRACTITIONER
Payer: MEDICAID

## 2025-02-04 ENCOUNTER — HOSPITAL ENCOUNTER (OUTPATIENT)
Age: 25
Discharge: HOME | End: 2025-02-04
Payer: MEDICAID

## 2025-02-04 VITALS
TEMPERATURE: 98 F | HEART RATE: 68 BPM | RESPIRATION RATE: 18 BRPM | SYSTOLIC BLOOD PRESSURE: 140 MMHG | OXYGEN SATURATION: 95 % | DIASTOLIC BLOOD PRESSURE: 68 MMHG

## 2025-02-04 DIAGNOSIS — R05.1 ACUTE COUGH: Primary | ICD-10-CM

## 2025-02-04 DIAGNOSIS — B34.9 VIRAL ILLNESS: ICD-10-CM

## 2025-02-04 DIAGNOSIS — H61.23 BILATERAL IMPACTED CERUMEN: ICD-10-CM

## 2025-02-04 LAB
POCT INFLUENZA A: NEGATIVE
POCT INFLUENZA B: NEGATIVE
S PYO AG THROAT QL: NEGATIVE
SARS-COV-2 RNA RESP QL NAA+PROBE: NOT DETECTED

## 2025-02-04 PROCEDURE — 71046 X-RAY EXAM CHEST 2 VIEWS: CPT | Performed by: NURSE PRACTITIONER

## 2025-02-04 PROCEDURE — 87502 INFLUENZA DNA AMP PROBE: CPT | Performed by: NURSE PRACTITIONER

## 2025-02-04 PROCEDURE — 99213 OFFICE O/P EST LOW 20 MIN: CPT | Performed by: NURSE PRACTITIONER

## 2025-02-04 PROCEDURE — U0002 COVID-19 LAB TEST NON-CDC: HCPCS | Performed by: NURSE PRACTITIONER

## 2025-02-04 PROCEDURE — 87880 STREP A ASSAY W/OPTIC: CPT | Performed by: NURSE PRACTITIONER

## 2025-02-04 RX ORDER — BENZONATATE 200 MG/1
200 CAPSULE ORAL 3 TIMES DAILY PRN
Qty: 21 CAPSULE | Refills: 0 | Status: SHIPPED | OUTPATIENT
Start: 2025-02-04

## 2025-02-04 NOTE — ED PROVIDER NOTES
Patient Seen in: [facility]      History     Chief Complaint   Patient presents with    Cough/URI    Sore Throat     Stated Complaint: sore throat, congested, spitting up blood in am    Subjective:   HPI    This is a 24-year-old female presenting with cough congestion sore throat and blood-tinged sputum.  Patient states her symptoms started today when she coughed up a couple times her sputum had blood in it.  Denies coughing up clyde blood or vomiting blood denies chest pain shortness of breath fever abdominal pain back pain.      Objective:     No pertinent past medical history.            No pertinent past surgical history.              No pertinent social history.            Review of Systems    Positive for stated complaint: sore throat, congested, spitting up blood in am  Other systems are as noted in HPI.  Constitutional and vital signs reviewed.      All other systems reviewed and negative except as noted above.    Physical Exam     ED Triage Vitals [02/04/25 1424]   /68   Pulse 68   Resp 18   Temp 98 °F (36.7 °C)   Temp src Oral   SpO2 95 %   O2 Device None (Room air)       Current Vitals:   Vital Signs  BP: 140/68  Pulse: 68  Resp: 18  Temp: 98 °F (36.7 °C)  Temp src: Oral    Oxygen Therapy  SpO2: 95 %  O2 Device: None (Room air)        Physical Exam  Vitals and nursing note reviewed.   Constitutional:       Appearance: Normal appearance.   HENT:      Right Ear: There is impacted cerumen.      Left Ear: There is impacted cerumen.      Nose: Congestion and rhinorrhea present.      Mouth/Throat:      Mouth: Mucous membranes are moist.      Pharynx: Oropharynx is clear. No posterior oropharyngeal erythema.      Tonsils: No tonsillar exudate or tonsillar abscesses. 0 on the right. 0 on the left.   Eyes:      Conjunctiva/sclera: Conjunctivae normal.   Cardiovascular:      Rate and Rhythm: Normal rate.   Pulmonary:      Effort: Pulmonary effort is normal. No respiratory distress.      Breath  sounds: Normal breath sounds. No wheezing.      Comments: + cough  Musculoskeletal:         General: Normal range of motion.      Cervical back: Normal range of motion. No rigidity or tenderness.   Lymphadenopathy:      Cervical: No cervical adenopathy.   Skin:     General: Skin is warm.      Capillary Refill: Capillary refill takes less than 2 seconds.   Neurological:      General: No focal deficit present.      Mental Status: She is alert and oriented to person, place, and time.             ED Course     Labs Reviewed   POCT RAPID STREP - Normal   RAPID SARS-COV-2 BY PCR - Normal   POCT FLU TEST - Normal    Narrative:     This assay is a rapid molecular in vitro test utilizing nucleic acid amplification of influenza A and B viral RNA.     XR CHEST PA + LAT CHEST (CPT=71046)    Result Date: 2/4/2025  CONCLUSION: Normal examination.     Dictated by (CST): Varun Smith MD on 2/04/2025 at 3:01 PM     Finalized by (CST): Varun Smith MD on 2/04/2025 at 3:01 PM                MDM           Medical Decision Making  24-year-old female nontoxic-appearing with viral symptoms that started today.  DDx URI versus pneumonia versus COVID versus influenza versus strep.  Chest x-ray will be ordered COVID flu and strep patient found to have cerumen impaction so ears will be irrigated.    Chest x-ray independently viewed by this provider no pneumonia COVID flu and strep negative ears irrigated by the tech on reevaluation TMs are nonerythematous and nonbulging canals are clear no mastoid TTP discussed results with the patient.  Discussed likely a viral illness discussed treatment with benzonatate.  Discussed over-the-counter medications to help with symptoms and outpatient follow-up and ER precautions.  Patient acknowledges understanding discharge instructions.    Problems Addressed:  Acute cough: acute illness or injury  Bilateral impacted cerumen: acute illness or injury  Viral illness: acute illness or injury    Amount and/or  Complexity of Data Reviewed  Labs: ordered. Decision-making details documented in ED Course.  Radiology: ordered and independent interpretation performed. Decision-making details documented in ED Course.    Risk  OTC drugs.  Prescription drug management.        Disposition and Plan     Clinical Impression:  1. Acute cough    2. Viral illness    3. Bilateral impacted cerumen         Disposition:  Discharge  2/4/2025  3:09 pm    Follow-up:  Ron French DO  [address] [place] [address]  [phone]    In 1 week            Medications Prescribed:  Discharge Medication List as of 2/4/2025  3:11 PM        START taking these medications    Details   benzonatate 200 MG Oral Cap Take 1 capsule (200 mg total) by mouth 3 (three) times daily as needed for cough., Normal, Disp-21 capsule, R-0                 Supplementary Documentation:

## 2025-02-04 NOTE — DISCHARGE INSTRUCTIONS
Take the benzonatate as needed to help with the cough recommend over-the-counter Flonase and Zyrtec to help with runny nose congestion or postnasal drip take over-the-counter Tylenol or ibuprofen with food on the stomach for pain or discomfort drink plenty of fluids eat as tolerated follow-up with your primary care provider within a week if you develop chest pain or coughing up your blood or vomiting blood shortness of breath high fever severe headache neck stiffness or swelling or any new or worsening symptoms go to the nearest emergency department.

## 2025-06-30 ENCOUNTER — HOSPITAL ENCOUNTER (OUTPATIENT)
Age: 25
Discharge: HOME | End: 2025-06-30

## 2025-06-30 VITALS
TEMPERATURE: 98 F | SYSTOLIC BLOOD PRESSURE: 134 MMHG | RESPIRATION RATE: 18 BRPM | DIASTOLIC BLOOD PRESSURE: 83 MMHG | OXYGEN SATURATION: 100 % | HEART RATE: 64 BPM

## 2025-06-30 DIAGNOSIS — R42 VERTIGO: Primary | ICD-10-CM

## 2025-06-30 LAB
#MXD IC: 0.7 X10ˆ3/UL (ref 0.1–1)
B-HCG UR QL: NEGATIVE
BUN BLD-MCNC: 11 MG/DL (ref 7–18)
CHLORIDE BLD-SCNC: 101 MMOL/L (ref 98–112)
CO2 BLD-SCNC: 28 MMOL/L (ref 21–32)
CREAT BLD-MCNC: 0.9 MG/DL (ref 0.55–1.02)
EGFRCR SERPLBLD CKD-EPI 2021: 91 ML/MIN/1.73M2 (ref 60–?)
GLUCOSE BLD-MCNC: 94 MG/DL (ref 70–99)
HCT VFR BLD AUTO: 41.5 % (ref 35–48)
HCT VFR BLD CALC: 47 % (ref 34–50)
HGB BLD-MCNC: 14.2 G/DL (ref 12–16)
ISTAT IONIZED CALCIUM FOR CHEM 8: 1.21 MMOL/L (ref 1.12–1.32)
LYMPHOCYTES # BLD AUTO: 2.4 X10ˆ3/UL (ref 1–4)
LYMPHOCYTES NFR BLD AUTO: 35.1 %
MCH RBC QN AUTO: 30.1 PG (ref 26–34)
MCHC RBC AUTO-ENTMCNC: 34.2 G/DL (ref 31–37)
MCV RBC AUTO: 87.9 FL (ref 80–100)
MIXED CELL %: 9.7 %
NEUTROPHILS # BLD AUTO: 3.7 X10ˆ3/UL (ref 1.5–7.7)
NEUTROPHILS NFR BLD AUTO: 55.2 %
PLATELET # BLD AUTO: 223 X10ˆ3/UL (ref 150–450)
POTASSIUM BLD-SCNC: 3.6 MMOL/L (ref 3.6–5.1)
RBC # BLD AUTO: 4.72 X10ˆ6/UL (ref 3.8–5.3)
SODIUM BLD-SCNC: 138 MMOL/L (ref 136–145)
WBC # BLD AUTO: 6.8 X10ˆ3/UL (ref 4–11)

## 2025-06-30 PROCEDURE — 99213 OFFICE O/P EST LOW 20 MIN: CPT

## 2025-06-30 PROCEDURE — S0119 ONDANSETRON 4 MG: HCPCS

## 2025-06-30 PROCEDURE — 80047 BASIC METABLC PNL IONIZED CA: CPT

## 2025-06-30 PROCEDURE — 81025 URINE PREGNANCY TEST: CPT

## 2025-06-30 PROCEDURE — 85025 COMPLETE CBC W/AUTO DIFF WBC: CPT

## 2025-06-30 RX ORDER — ONDANSETRON 4 MG/1
4 TABLET, ORALLY DISINTEGRATING ORAL EVERY 4 HOURS PRN
Qty: 10 TABLET | Refills: 0 | Status: SHIPPED | OUTPATIENT
Start: 2025-06-30 | End: 2025-07-07

## 2025-06-30 RX ORDER — MECLIZINE HYDROCHLORIDE 25 MG/1
25 TABLET ORAL ONCE
Status: COMPLETED | OUTPATIENT
Start: 2025-06-30 | End: 2025-06-30

## 2025-06-30 RX ORDER — MECLIZINE HCL 12.5 MG 12.5 MG/1
TABLET ORAL 3 TIMES DAILY PRN
Qty: 30 TABLET | Refills: 0 | Status: SHIPPED | OUTPATIENT
Start: 2025-06-30

## 2025-06-30 RX ORDER — ONDANSETRON 4 MG/1
4 TABLET, ORALLY DISINTEGRATING ORAL ONCE
Status: COMPLETED | OUTPATIENT
Start: 2025-06-30 | End: 2025-06-30

## 2025-06-30 NOTE — ED INITIAL ASSESSMENT (HPI)
Pt with episode of dizziness at 0200 and again at 0900 today. Pt reports that it felt as if everything was spinning. Pt has nausea, no vomiting.

## 2025-06-30 NOTE — DISCHARGE INSTRUCTIONS
Your symptoms are most consistent with vertigo.  I sent a prescription for Zofran, the nausea medication that you can use at home as needed.  Has a sent you a prescription for meclizine, the medication for the vertigo that can be used as needed.  Please be sure that you are staying well-hydrated.  If your symptoms persist you should follow-up with your primary care doctor or neurologist as discussed.  If you develop any acutely worsening dizziness, passout or have any other worsening or concerning complaints you should go to the emergency department.  Otherwise follow-up with your primary care provider.

## 2025-06-30 NOTE — ED PROVIDER NOTES
Patient Seen in: [facility]      History     Chief Complaint   Patient presents with    Dizziness     Stated Complaint: lightheaded  Subjective:   Alexander is a 24-year-old female presenting to the immediate care complaining of intermittent dizziness that started in the middle of the night.  Patient states that she got up at about 2:00 this morning to go to the bathroom and she felt like everything was spinning.  States that she had something to eat and went back to sleep and felt okay.  Woke up at 9 AM and again had some dizziness with spinning sensation so she came in for evaluation.  Patient has had some mild accompanying nausea.  No vomiting.  She has otherwise been feeling well lately.  She denies any headache, blurred vision, syncope.  States that she has not had a fever or recent illness.  Denies any chest pain, shortness of breath, abdominal pain or vomiting.  Denies any urinary symptoms.  Denies any head injury or trauma.  She is otherwise been feeling well.  She denies any other concerns or complaints.        Objective:   Past Medical History:    Acne    Conjunctivitis of left eye    per [employer]:  \"Conjunctivitis with plaque, OS\"            History reviewed. No pertinent surgical history.           Social History     Socioeconomic History    Marital status: Single   Tobacco Use    Smoking status: Never    Smokeless tobacco: Never   Vaping Use    Vaping status: Never Used   Substance and Sexual Activity    Alcohol use: No    Drug use: No   Other Topics Concern    Caffeine Concern Yes     Comment: Soda, 2 a day    Pt has a pacemaker No    Pt has a defibrillator No    Reaction to local anesthetic No            Review of Systems    Positive for stated complaint: Dizziness    Other systems are as noted in HPI.  Constitutional and vital signs reviewed.      All other systems reviewed and negative except as noted above.    Physical Exam     ED Triage Vitals [06/30/25 1150]   /83   Pulse 64    Resp 18   Temp 98 °F (36.7 °C)   Temp src Oral   SpO2 100 %   O2 Device None (Room air)     Current:/83   Pulse 64   Temp 98 °F (36.7 °C) (Oral)   Resp 18   LMP 06/01/2025 (Exact Date)   SpO2 100%     Physical Exam  Vitals and nursing note reviewed.   Constitutional:       General: She is not in acute distress.     Appearance: Normal appearance. She is not ill-appearing, toxic-appearing or diaphoretic.   HENT:      Head: Normocephalic.   Eyes:      General: No visual field deficit.     Pupils: Pupils are equal, round, and reactive to light.   Cardiovascular:      Rate and Rhythm: Normal rate and regular rhythm.      Pulses: Normal pulses.      Heart sounds: Normal heart sounds.   Pulmonary:      Effort: Pulmonary effort is normal.      Breath sounds: Normal breath sounds.   Musculoskeletal:         General: Normal range of motion.      Cervical back: Normal range of motion.   Skin:     General: Skin is warm and dry.      Capillary Refill: Capillary refill takes less than 2 seconds.   Neurological:      General: No focal deficit present.      Mental Status: She is alert and oriented to person, place, and time.      GCS: GCS eye subscore is 4. GCS verbal subscore is 5. GCS motor subscore is 6.      Cranial Nerves: Cranial nerves 2-12 are intact. No cranial nerve deficit, dysarthria or facial asymmetry.      Sensory: Sensation is intact. No sensory deficit.      Motor: Motor function is intact. No weakness, tremor, atrophy, abnormal muscle tone, seizure activity or pronator drift.      Coordination: Coordination is intact. Romberg sign negative. Coordination normal. Finger-Nose-Finger Test and Heel to Shin Test normal. Rapid alternating movements normal.      Gait: Gait is intact. Gait and tandem walk normal.   Psychiatric:         Mood and Affect: Mood normal.         Behavior: Behavior normal.         Thought Content: Thought content normal.         Judgment: Judgment normal.         ED Course    Radiology:    No orders to display     Labs Reviewed   POCT PREGNANCY URINE - Normal   POCT ISTAT CHEM8 CARTRIDGE - Normal   POCT CBC       MDM     Medical Decision Making  Differential diagnoses reflecting the complexity of care include but are not limited to vertigo, electrolyte imbalance, anemia, pregnancy, less likely acute intracranial process.    Comorbidities that add complexity to management include: High insulin levels  History obtained by an independent source was from: Patient  Patient is well appearing, non-toxic and in no acute distress.  Vital signs are stable.     Healthy 24-year-old female presents to the immediate care describing vertiginous symptoms that started in the middle of the night with some mild accompanying nausea.  No vomiting.  No recent illness.  Patient's neurologic exam is completely normal.  I did labs given the patient's history of high insulin levels.  She also states that she occasionally has low iron and heavy menstrual cycles.  CBC and chemistry were normal.  Urine pregnancy test was negative.  Patient was given a dose of Zofran and meclizine in the immediate care with relief of her symptoms.  States that she is feeling much better and would like to go home.  Sent her prescription for Zofran and meclizine to be used at home as needed.  Recommended that if the patient has persistent symptoms that she should make an appointment to follow-up with her primary care doctor and/or neurologist.  Recommended that if the patient develop any acutely worsening symptoms, passing out or any other worsening or concerning complaints that she go to the emergency department.  Otherwise recommend following up with PCP.    ED precautions discussed.  Patient (guardian) advised to follow up with PCP in 2-3 days.  Patient (guardian) agrees with this plan of care.  Patient (guardian) verbalizes understanding of discharge instructions and plan of care.      Amount and/or Complexity of Data  Reviewed  Labs: ordered. Decision-making details documented in ED Course.    Risk  OTC drugs.  Prescription drug management.        Disposition and Plan     Clinical Impression:  1. Vertigo         Disposition:  Discharge  6/30/2025 12:59 pm    Follow-up:  Lynda Pastrana, APRN  [address] [facility] [address]  [facility] [address]  [phone]          Vita Bardales, DO  [address]  [facility] [address]  [phone]                Medications Prescribed:  Current Discharge Medication List        START taking these medications    Details   meclizine 12.5 MG Oral Tab Take 1-2 tablets (12.5-25 mg total) by mouth 3 (three) times daily as needed.  Qty: 30 tablet, Refills: 0      ondansetron 4 MG Oral Tablet Dispersible Take 1 tablet (4 mg total) by mouth every 4 (four) hours as needed for Nausea.  Qty: 10 tablet, Refills: 0

## (undated) NOTE — MR AVS SNAPSHOT
[facility] SPECIALTY [facility] - [place] [address] [address] [id number] [phone]               Thank you for choosing us for your health care visit with Jay Martin MD.  We are glad to serve you and happy to provide you with this summary of y [address], [phone], [address] [facility] [address]    Hours:  24-hours Phone:  [phone]    - Clindamycin Phos-Benzoyl Perox 1-5 % Gel  - Doxycycline Hyclate 100 MG Caps  - tretinoin 0.025 % Crea o playing a game of tag  o cooking healthy meals together  o creating a rainbow shopping list to find colorful fruits and vegetables  o go on a walking scavenger hunt through the neighborhood   o grow a family garden    In addition to 5, 4, 3, 2, 1 familie

## (undated) NOTE — LETTER
Date & Time: 7/23/2024, 2:24 PM  Patient: Alexander Smith  Encounter Provider(s):    Mary Grace Dubose APRN       To Whom It May Concern:    Alexander Smith was seen and treated in our department on 7/23/2024. She should not return to work until 07/25/2024 .    If you have any questions or concerns, please do not hesitate to call.    ERMIAS Colin    _____________________________  Physician/APC Signature

## (undated) NOTE — LETTER
Patient: Alexander Smith   YOB: 2000   Date of Visit: 8/19/2024     Dear Employer,        August 19, 2024    At [facility], we are taking special precautions and doing everything we can to prevent the spread of COVID-19. During this time, we ask for your assistance regarding physician documentation for employees to return to work following a respiratory illness.     The Centers for Disease Control (CDC) recommends the following:    Ensure that sick leave policies are flexible and consistent with public health guidance, and employees are aware of and understand these policies.   Maintain flexible policies that permit employees to stay home to care for a sick family member or to take care of children due to school and  closures.   Employers should not require a COVID-19 test result or a healthcare provider’s note for sick employees to validate their illness, qualify for sick leave or return to work.   Be aware that healthcare provider offices and medical facilities may be extremely busy and not able to provide documentation in a timely manner. Most people with COVID-19 have mild illness, can recover at home without medical care and can follow CDC recommendations to determine when to discontinue home isolation and return to work.      Individuals with COVID-19 symptoms directed to care for themselves at home should:  Isolate for five days. If individuals are asymptomatic or symptoms are resolving (without fever for 24 hours), isolation may be discontinued on day six. Onset of symptoms is considered day zero.   Follow isolation by five days of mask wearing when around others to minimize the risk of infection.     Individuals infected with SARS-CoV-2 who never develop COVID-19 symptoms:    Day of positive test is considered day zero.   Isolate for five days.  Can discontinue isolation on day six.   Follow isolation by five days of wearing a mask when around others to minimize the risk of  infection.    Individuals who are asymptomatic but have been exposed:  For people who are unvaccinated or are more than six months out from their second mRNA dose (or more than two months after the J&J vaccine) and not yet boosted, CDC now recommends quarantine for five days followed by strict, mask use for an additional five days. Alternatively, if a five-day quarantine is not feasible, it is imperative that an exposed person wear a well-fitting mask at all times when around others for 10 days after exposure.   Individuals who have received their booster shot do not need to quarantine following an exposure but should wear a mask for 10 days after the exposure.    Best practice would also include a test on day five after exposure.   If symptoms occur, individuals should immediately quarantine until a negative test confirms symptoms are not attributable to COVID-19.     Please visit the CDC website for further information and details to assist you during this challenging time.     Sincerely,     Jason MONSON

## (undated) NOTE — ED AVS SNAPSHOT
[facility] Emergency Department    [address]     [place] [address]    Phone:  [phone]    Fax:  [phone]           Deann Josh   MRN: [id number]    Department:  [facility] Emergency Department   Date of Visit:  5/3/201 and Class Registration line at [phone] or find a doctor online by visiting [email or web address].    IF THERE IS ANY CHANGE OR WORSENING OF YOUR CONDITION, CALL YOUR PRIMARY CARE PHYSICIAN AT ONCE OR RETURN IMMEDIATELY TO [address] [place] [address].     If

## (undated) NOTE — LETTER
AUTHORIZATION FOR SURGICAL OPERATION OR OTHER PROCEDURE    1. I hereby authorize Lynda ORNELAS, and [facility] staff assigned to my case to perform the following operation and/or procedure at the [facility] site:    __Skin Tag Removal _____________________________________________________________________________________________      _______________________________________________________________________________________________    2.  My physician has explained the nature and purpose of the operation or other procedure, possible alternative methods of treatment, the risks involved, and the possibility of complication to me.  I acknowledge that no guarantee has been made as to the result that may be obtained.  3.  I recognize that, during the course of this operation, or other procedure, unforseen conditions may necessitate additional or different procedure than those listed above.  I, therefore, further authorize and request that the above named physician, his/her physician assistants or designees perform such procedures as are, in his/her professional opinion, necessary and desirable.  4.  Any tissue or organs removed in the operation or other procedure may be disposed of by and at the discretion of the [facility] and [facility].  5.  I understand that in the event of a medical emergency, I will be transported by local paramedics to [facility] or other hospital emergency department.  6.  I certify that I have read and fully understand the above consent to operation and/or other procedure.    7.  I acknowledge that my physician has explained sedation/analgesia administration to me including the risks and benefits.  I consent to the administration of sedation/analgesia as may be necessary or desirable in the judgement of my physician.    Witness signature: ___________________________________________________ Date:  ______/______/_____                     Time:  ________ A.M.  P.M.       Patient Name:  ______________________________________________________  (please print)      Patient signature:  ___________________________________________________             Relationship to Patient:           []  Parent    Responsible person                          []  Spouse  In case of minor or                    [] Other  _____________   Incompetent name:  __________________________________________________                               (please print)      _____________      Responsible person  In case of minor or  Incompetent signature:  _______________________________________________    Statement of Physician  My signature below affirms that prior to the time of the procedure, I have explained to the patient and/or his/her guardian, the risks and benefits involved in the proposed treatment and any reasonable alternative to the proposed treatment.  I have also explained the risks and benefits involved in the refusal of the proposed treatment and have answered the patient's questions.                        Date:  ______/______/_______  Provider                      Signature:  __________________________________________________________       Time:  ___________ A.M    P.M.

## (undated) NOTE — LETTER
Date: 4/10/2023    Patient Name: Mega Ojeda. O.B [birth year]        To Whom it may concern:      Patient named above is under my care and should be excused from attending work/school for 04/08/2023 and should return with no restrictions as of 04/09/2023.         Sincerely,      Keith Hilton MD

## (undated) NOTE — LETTER
Date: 2022    Patient Name: Ronen Hammond    08/10/2000        To Whom it may concern: This letter has been written at the patient's request. The above patient was seen at the [facility] for treatment of a medical condition. This patient should be excused from attending work/school from today only and may return 2022 with no limitations. If you have any questions feel free to contact my office.         Sincerely,      Maria Ines Morales MD

## (undated) NOTE — LETTER
Date & Time: 2/4/2025, 3:09 PM  Patient: Alexander Smith  Encounter Provider(s):    Mary Grace Dubose APRN       To Whom It May Concern:    Alexander Smith was seen and treated in our department on 2/4/2025. She should not return to work until 02/06/2025 .    If you have any questions or concerns, please do not hesitate to call.    ERMIAS Colin    _____________________________  Physician/APC Signature

## (undated) NOTE — LETTER
Date & Time: 7/30/2024, 11:29 AM  Patient: Alexander Smith  Encounter Provider(s):    Enma King APRN       To Whom It May Concern:    Alexander Smith was seen and treated in our department on 7/30/2024. She  may return to work without restriction .    If you have any questions or concerns, please do not hesitate to call.        _____________________________  Physician/APC Signature

## (undated) NOTE — ED AVS SNAPSHOT
[facility] Emergency Department    [address]     [place] [address]    Phone:  [phone]    Fax:  [phone]           Mary Mccrary   MRN: [id number]    Department:  [facility] Emergency Department   Date of Visit:  5/3/201 If you have difficulty scheduling your follow-up appointment as directed, please call our  at [phone]. Si tiene problemas para programar yordan jaspal de seguimiento según lo indicado, llame al encargado de amanda al [phone].     It i continue to take your medications as instructed by your Primary Care doctor until you can check with your doctor. Please bring the medication list to your next doctor's appointment.     Any imaging studies and labs completed today can be reviewed in your [place] plans. To get signed up and covered, please call [phone] and ask to get set up for an insurance coverage that is in-network with [employer] Tr[employer]. [employer]janis     Sign up for [employer] access for your child.   [employer] access allows y

## (undated) NOTE — MR AVS SNAPSHOT
[facility] SPECIALTY [facility] - [place] [address] [address]  [address] [phone]               Thank you for choosing us for your health care visit with Sindy Montero MD.  We are glad to serve you and happy to provide you with this summary of y - Doxycycline Hyclate 50 MG Caps  - Tretinoin 0.05 % Crea            MyChart     Sign up for [employer] access for your child.   [employer] access allows you to view health information for your child from their recent   visit, view other health information and m

## (undated) NOTE — MR AVS SNAPSHOT
[facility] SPECIALTY [facility] - [place] [address] [address]  [id number] [phone]               Thank you for choosing us for your health care visit with Cely Obregon MD.  We are glad to serve you and happy to provide you with this summary of y medications prescribed for you. Read the directions carefully, and ask your doctor or other care provider to review them with you.          Where to Get Your Medications      These medications were sent to [facility] [address] [facility]

## (undated) NOTE — LETTER
9/19/2023          To Whom It May Concern:    Meron Crow is currently under my medical care and may not return to work at this time. Please excuse Rosibel Tariq for 2 days. She may return to work on 9/20/23. Activity is restricted as follows: none. If you require additional information please contact our office.         Sincerely,      CECI Hernández          Document generated by:  CECI Hernández

## (undated) NOTE — LETTER
Patient: Alexander Smith   YOB: 2000   Date of Visit: 8/19/2024     Dear Employer,        August 19, 2024    At [facility], we are taking special precautions and doing everything we can to prevent the spread of COVID-19. During this time, we ask for your assistance regarding physician documentation for employees to return to work following a respiratory illness.     The Centers for Disease Control (CDC) recommends the following:    Ensure that sick leave policies are flexible and consistent with public health guidance, and employees are aware of and understand these policies.   Maintain flexible policies that permit employees to stay home to care for a sick family member or to take care of children due to school and  closures.   Employers should not require a COVID-19 test result or a healthcare provider’s note for sick employees to validate their illness, qualify for sick leave or return to work.   Be aware that healthcare provider offices and medical facilities may be extremely busy and not able to provide documentation in a timely manner. Most people with COVID-19 have mild illness, can recover at home without medical care and can follow CDC recommendations to determine when to discontinue home isolation and return to work.      Individuals with COVID-19 symptoms directed to care for themselves at home should:  Isolate for five days. If individuals are asymptomatic or symptoms are resolving (without fever for 24 hours), isolation may be discontinued on day six. Onset of symptoms is considered day zero.   Follow isolation by five days of mask wearing when around others to minimize the risk of infection.     Individuals infected with SARS-CoV-2 who never develop COVID-19 symptoms:    Day of positive test is considered day zero.   Isolate for five days.  Can discontinue isolation on day six.   Follow isolation by five days of wearing a mask when around others to minimize the risk of  infection.    Individuals who are asymptomatic but have been exposed:  For people who are unvaccinated or are more than six months out from their second mRNA dose (or more than two months after the J&J vaccine) and not yet boosted, CDC now recommends quarantine for five days followed by strict, mask use for an additional five days. Alternatively, if a five-day quarantine is not feasible, it is imperative that an exposed person wear a well-fitting mask at all times when around others for 10 days after exposure.   Individuals who have received their booster shot do not need to quarantine following an exposure but should wear a mask for 10 days after the exposure.    Best practice would also include a test on day five after exposure.   If symptoms occur, individuals should immediately quarantine until a negative test confirms symptoms are not attributable to COVID-19.     Please visit the CDC website for further information and details to assist you during this challenging time.     Sincerely,     No att. providers found

## (undated) NOTE — LETTER
7/29/2022              [address] [place] [address] [address] [place] [address]         To Whom It May Concern,    Arden Pa is currently under my care. Please excuse her for the day of 7/29/22. She may return to work 7/30/22. You may contact the office with any questions or concerns at [phone].          Sincerely,      Travis Roland, APRN  [address], [address] [place] [id number] [phone]        Document electronically generated by:  Shay Herron

## (undated) NOTE — LETTER
May 3, 2017    Patient: Gissell Mclaughlin   Date of Visit: 5/3/2017       To Whom It May Concern:    Gissell Mclaughlin was seen and treated in our emergency department on 5/3/2017. She may return to work 5/4/2017.     If you have any questions or concerns, please

## (undated) NOTE — LETTER
Date & Time: 7/9/2020, 8:57 AM  Patient: Paul Stewart  Encounter Provider(s):    Mir Stout MD       To Whom It May Concern:    Paul Stewart was seen and treated in our department on 7/9/2020. She can return to work.     If you have any questions or c